# Patient Record
Sex: FEMALE | Race: WHITE | Employment: FULL TIME | URBAN - METROPOLITAN AREA
[De-identification: names, ages, dates, MRNs, and addresses within clinical notes are randomized per-mention and may not be internally consistent; named-entity substitution may affect disease eponyms.]

---

## 2017-06-26 ENCOUNTER — HOSPITAL ENCOUNTER (OUTPATIENT)
Dept: DIABETES SERVICES | Age: 52
Discharge: HOME | End: 2017-06-26
Attending: INTERNAL MEDICINE
Payer: COMMERCIAL

## 2017-06-26 DIAGNOSIS — E11.9 DIABETES MELLITUS WITHOUT COMPLICATION (HCC): ICD-10-CM

## 2017-06-26 PROCEDURE — G0109 DIAB MANAGE TRN IND/GROUP: HCPCS | Performed by: DIETITIAN, REGISTERED

## 2017-06-26 NOTE — DIABETES MGMT
06/26/17        Thank you for your kind referral. Your patient Darwin Barboza, attended Session #1 at [address] [place] [address] where the following topics were covered today . * Describing diabetes disease process and treatment options  * Incorporating nutrition management into their lifestyle  * Monitoring blood glucose and other parameters and interpreting and using the results       for self management decision making. * Preventing detecting and treating acute complications  * Incorporating physical activity into their lifestyle  * Using medications safely and for the maximum therapeutic effectiveness  * Developing personal strategies to promote health and behavior changes  * Developing personal strategies to address psychosocial issues and concerns    Data from first visit:  Weight: 6/26/2017 267.2 #  HgbA1c: 6/26/2017 6.3 %  Increased risk for diabetes: 5.7-6.4%, Diabetes >6.4%  Glycemic control for adults with diabetes: < 7% Elderly or multiple medical conditions <8%  Random blood glucose: 6/26/2017 2 Hrs Post-Lunch 121 mg/dl  Meter given: One Touch Verio Flex  Goal(s) set : Goal 1: Lower A1c by eating less sugar which will help with weight loss    Your patient will have two (2) additional appointments to complete the ordered education. Their next visit is scheduled for 7-17-17. We look forward to assisting your patient in meeting their self- management goals.  If you have any questions please do not hesitate to call the [facility] at [phone]      Sincerely,  Je Ludwig RD, [address], [address]  Phone: [phone] Fax : [phone]

## 2017-08-03 ENCOUNTER — HOSPITAL ENCOUNTER (OUTPATIENT)
Dept: DIABETES SERVICES | Age: 52
Discharge: HOME | End: 2017-08-03
Attending: INTERNAL MEDICINE
Payer: COMMERCIAL

## 2017-08-03 DIAGNOSIS — E11.9 DIABETES MELLITUS WITHOUT COMPLICATION (HCC): ICD-10-CM

## 2017-08-03 PROCEDURE — G0109 DIAB MANAGE TRN IND/GROUP: HCPCS | Performed by: DIETITIAN, REGISTERED

## 2017-08-04 NOTE — DIABETES MGMT
08/04/17      Thank you for your kind referral. Your patient Bacilio Mariscal attended Session #2 at [facility] where the following topics were covered yesterday:      * Incorporating physical activity into lifestyle  * Incorporating nutrition management into lifestyle  * Preventing, detecting and treating acute complications  * Preventing, detecting and treating chronic complications     Your patient will have one( 1) additional appointment to complete the ordered education. Their next visit is scheduled for 8/21/17. We look forward to assisting your patient in meeting their self- management goals.  If you have any questions, please do not hesitate to call the [address] at [phone]    Sincerely,     Nonah Severance, [address]  [address], [address]  Phone:  [phone]  Fax: [phone]

## 2017-08-21 ENCOUNTER — HOSPITAL ENCOUNTER (OUTPATIENT)
Dept: DIABETES SERVICES | Age: 52
Discharge: HOME | End: 2017-08-21
Payer: COMMERCIAL

## 2017-08-21 DIAGNOSIS — E11.9 DIABETES MELLITUS WITHOUT COMPLICATION (HCC): ICD-10-CM

## 2017-08-21 PROCEDURE — G0109 DIAB MANAGE TRN IND/GROUP: HCPCS | Performed by: DIETITIAN, REGISTERED

## 2017-08-21 NOTE — DIABETES MGMT
08/21/17    Thank you for your kind referral. Your patient Harrison Hoyos, attended Session #3 at [address] [place] [address] where the following topics were covered today . * Developing personal strategies to promote health and behavior change  * Development of a diabetes self management support plan  * Incorporating physical activity  * Preventing, detecting and treating chronic complications  * Incorporating nutritional management into lifestyle      Data from visit:  Weight: 6/26/2017 267.2 #; 8/21/2017 264.4 #  HgbA1c: 6/26/2017 6.3 % , 8/21/2017 6.1 %  Increased risk for diabetes: 5.7-6.4 %, Diabetes: >6.4%  Glycemic control for adults with diabetes: <7% Elderly or multiple medical conditions: <8%    Your patient continued the following goal(s) from their first class: Goal 1: Lower A1c by eating less sugar which will help with weight loss    Your patient chose to continue learning about their diabetes after completion of their education by:   Try new recipes and/or cook at home more often      At this time your patient has completed their scheduled education.  If you have any questions, please do not hesitate to call the [facility] at [phone]    Sincerely,    Efrem Mtz, RD, [address] [facility] [address]  [facility] [place] [address] [place] [address] Road   [facility], [address]

## 2018-01-12 ENCOUNTER — HOSPITAL ENCOUNTER (OUTPATIENT)
Dept: DIABETES SERVICES | Age: 53
Discharge: HOME | End: 2018-01-12
Payer: COMMERCIAL

## 2018-01-12 DIAGNOSIS — E11.9 DIABETES MELLITUS WITHOUT COMPLICATION (HCC): ICD-10-CM

## 2018-01-12 PROCEDURE — G0109 DIAB MANAGE TRN IND/GROUP: HCPCS | Performed by: DIETITIAN, REGISTERED

## 2021-06-29 ENCOUNTER — OFFICE VISIT (OUTPATIENT)
Dept: HEMATOLOGY | Age: 56
End: 2021-06-29
Payer: COMMERCIAL

## 2021-06-29 VITALS
WEIGHT: 275.6 LBS | HEIGHT: 66 IN | OXYGEN SATURATION: 97 % | BODY MASS INDEX: 44.29 KG/M2 | RESPIRATION RATE: 17 BRPM | DIASTOLIC BLOOD PRESSURE: 91 MMHG | HEART RATE: 68 BPM | TEMPERATURE: 96.9 F | SYSTOLIC BLOOD PRESSURE: 147 MMHG

## 2021-06-29 DIAGNOSIS — K76.0 FATTY LIVER: Primary | ICD-10-CM

## 2021-06-29 DIAGNOSIS — R74.8 ELEVATED LIVER ENZYMES: ICD-10-CM

## 2021-06-29 PROBLEM — E11.9 TYPE II DIABETES MELLITUS (HCC): Status: ACTIVE | Noted: 2021-06-29

## 2021-06-29 PROBLEM — E03.9 HYPOTHYROIDISM: Status: ACTIVE | Noted: 2021-06-29

## 2021-06-29 PROBLEM — F32.A DEPRESSION: Status: ACTIVE | Noted: 2021-06-29

## 2021-06-29 PROBLEM — E55.9 HYPOVITAMINOSIS D: Status: ACTIVE | Noted: 2021-06-29

## 2021-06-29 PROCEDURE — 99203 OFFICE O/P NEW LOW 30 MIN: CPT | Performed by: INTERNAL MEDICINE

## 2021-06-29 RX ORDER — BLOOD SUGAR DIAGNOSTIC
STRIP MISCELLANEOUS
COMMUNITY
Start: 2021-04-28

## 2021-06-29 RX ORDER — FUROSEMIDE 20 MG/1
TABLET ORAL AS NEEDED
Status: ON HOLD | COMMUNITY
Start: 2021-04-26 | End: 2021-08-31

## 2021-06-29 RX ORDER — CLOBETASOL PROPIONATE 0.5 MG/G
OINTMENT TOPICAL
COMMUNITY
End: 2022-09-14

## 2021-06-29 RX ORDER — TOPIRAMATE 50 MG/1
50 TABLET, FILM COATED ORAL DAILY
COMMUNITY
End: 2022-05-12

## 2021-06-29 RX ORDER — METFORMIN HYDROCHLORIDE 500 MG/1
500 TABLET ORAL 2 TIMES DAILY WITH MEALS
COMMUNITY

## 2021-06-29 RX ORDER — ESCITALOPRAM OXALATE 10 MG/1
10 TABLET ORAL DAILY
COMMUNITY
End: 2022-09-14

## 2021-06-29 RX ORDER — LEVOTHYROXINE SODIUM 125 UG/1
125 TABLET ORAL
COMMUNITY

## 2021-06-29 RX ORDER — MELATONIN
2000 DAILY
COMMUNITY

## 2021-06-29 NOTE — PROGRESS NOTES
Identified pt with two pt identifiers(name and ). Reviewed record in preparation for visit and have obtained necessary documentation. Chief Complaint   Patient presents with    New Patient     Establish care      Vitals:    21 1024   BP: (!) 147/91   Pulse: 68   Resp: 17   Temp: 96.9 °F (36.1 °C)   TempSrc: Temporal   SpO2: 97%   Weight: 275 lb 9.6 oz (125 kg)   Height: 5' 6\" (1.676 m)   PainSc:   0 - No pain       Health Maintenance Review: Patient reminded of \"due or due soon\" health maintenance. I have asked the patient to contact his/her primary care provider (PCP) for follow-up on his/her health maintenance. Coordination of Care Questionnaire:  :   1) Have you been to an emergency room, urgent care, or hospitalized since your last visit? If yes, where when, and reason for visit? no       2. Have seen or consulted any other health care provider since your last visit? If yes, where when, and reason for visit? NO      Patient is accompanied by self I have received verbal consent from Bacilio Mariscal to discuss any/all medical information while they are present in the room.

## 2021-06-29 NOTE — PROGRESS NOTES
[address] [facility] [address]      Conor Lucia MD, Debbie Rothman, Aggie Monzon MD, MPH      Jamir Spain, PAMARIAA Foreman, ACNP-BC     April S Sonya, [facility]   [place], FNP-C    José Miguel Rosado, [facility]-BC       [facility] [address]    at [address], 81 [facility], [facility] 22.    [phone]    FAX: [address] [place] [address] [address], [address]    [phone]    FAX: [phone]       Patient Care Team:  Camille Colindres MD as PCP - General (Family Medicine)      Problem List  Date Reviewed: 6/29/2021        Codes Class Noted    Elevated liver enzymes ICD-10-CM: R74.8  ICD-9-CM: 790.5  6/29/2021        Hypothyroidism ICD-10-CM: E03.9  ICD-9-CM: 244.9  6/29/2021        Fatty liver ICD-10-CM: K76.0  ICD-9-CM: 571.8  6/29/2021        Type II diabetes mellitus ([facility] ICD-10-CM: E11.9  ICD-9-CM: 250.00  6/29/2021        Hypovitaminosis D ICD-10-CM: E55.9  ICD-9-CM: 268.9  6/29/2021        Depression ICD-10-CM: F32.9  ICD-9-CM: 177  6/29/2021              The clinicians listed above have asked me to see Shima Rosario in consultation regarding elevated liver enzymes and its management. All medical records sent by the referring physicians were reviewed     The patient is a 60 y.o.  female who was found to have elevated liver transaminases in the 1990s. Serologic evaluation for markers of chronic liver disease has either not been performed or the results are not available. Imaging of the liver was not performed. An assessment of liver fibrosis with biopsy, Fibroscan or elastography has not been performed. The patient does not have any symptoms which could be attributed to the liver disorder.     The patient is not experiencing the following symptoms which are commonly seen in this liver disorder:   fatigue,   pain in the right side over the liver,     The patient completes all daily activities without any functional limitations. ASSESSMENT AND PLAN:  Elevated liver enzymes  Persistent elevation in liver transaminases of unclear etiology at this time. ALP is normal.  Liver function is normal.  The platelet count is normal.      Serologic testing for causes of chronic liver disease was ordered. All was negative     The most likely causes for the liver chemistry abnormalities were discussed with the patient and include fatty liver disease,     The need to perform an assessment of liver fibrosis was discussed with the patient. The Fibroscan can assess liver fibrosis and determine if a patient has advanced fibrosis or cirrhosis without the need for liver biopsy. This will be performed at the next office visit. If the Fibroscan suggests advanced fibrosis then a liver biopsy should be considered. The Fibroscan can be repeated annually or as often as clinically indicated to assess for fibrosis progression and/or regression. Have performed laboratory testing to monitor liver function and degree of liver injury. This included BMP, hepatic panel, CBC with platelet count, INR. Will perform imaging of the liver with ultrasound. Elevation in Ferritin  There is an elevation in ferritin with Normal iron saturation. It is unlikely that the patient has hemochromatosis or is a carrier for an HFE gene. Suspect the elevation in ferritin reflects hepatic inflammation from NAFLD    Screening for Hepatocellular Carcinoma  HCC screening is not necessary if the patient has no evidence of cirrhosis.     Treatment of other medical problems in patients with chronic liver disease  There are no contraindications for the patient to take most medications that are necessary for treatment of other medical issues. Counseling for alcohol in patients with chronic liver disease  The patient does not consume any significant amount of alcohol. Vaccinations   Vaccination for viral hepatitis A and B is recommended since the patient has no serologic evidence of previous exposure or vaccination with immunity. Routine vaccinations against other bacterial and viral agents can be performed as indicated. Annual flu vaccination should be administered if indicated. ALLERGIES  No Known Allergies    MEDICATIONS  Current Outpatient Medications   Medication Sig    metFORMIN (GLUCOPHAGE) 500 mg tablet Take 500 mg by mouth two (2) times daily (with meals). Take 2 tabs twice daily with meals    levothyroxine (SYNTHROID) 125 mcg tablet Take 125 mcg by mouth Daily (before breakfast).  furosemide (LASIX) 20 mg tablet as needed.  escitalopram oxalate (LEXAPRO) 10 mg tablet Take 10 mg by mouth daily.  topiramate (TOPAMAX) 50 mg tablet Take 50 mg by mouth daily.  cholecalciferol (Vitamin D3) (1000 Units /25 mcg) tablet Take 2,000 Units by mouth daily.  clobetasoL (TEMOVATE) 0.05 % ointment clobetasol 0.05 % topical ointment   apply nightly x 2 weeks then 2-3 nights per week as needed    OneTouch Verio test strips strip      No current facility-administered medications for this visit. SYSTEM REVIEW NOT RELATED TO LIVER DISEASE OR REVIEWED ABOVE:  Constitution systems: Negative for fever, chills, weight gain, weight loss. Eyes: Negative for visual changes. ENT: Negative for sore throat, painful swallowing. Respiratory: Negative for cough, hemoptysis, SOB. Cardiology: Negative for chest pain, palpitations. GI:  Negative for constipation or diarrhea. : Negative for urinary frequency, dysuria, hematuria, nocturia. Skin: Negative for rash. Hematology: Negative for easy bruising, blood clots. Musculo-skelatal: Negative for back pain, muscle pain, weakness.   Neurologic: Negative for headaches, dizziness, vertigo, memory problems not related to HE. Psychology: Negative for anxiety, depression. FAMILY HISTORY:  The father  of heart disease. The mother Has/had the following chronic disease(s): dementia. There is no family history of liver disease. SOCIAL HISTORY:  The patient is . The patient has 2 stepchildren,   The patient has never used tobacco products. The patient has never consumed significant amounts of alcohol. The patient currently works full time as . PHYSICAL EXAMINATION:  Visit Vitals  BP (!) 147/91 (BP 1 Location: Right upper arm, BP Patient Position: Sitting, BP Cuff Size: Large adult)   Pulse 68   Temp 96.9 °F (36.1 °C) (Temporal)   Resp 17   Ht 5' 6\" (1.676 m)   Wt 275 lb 9.6 oz (125 kg)   SpO2 97%   BMI 44.48 kg/m²     General: No acute distress. Eyes: Sclera anicteric. ENT: No oral lesions. Thyroid normal.  Nodes: No adenopathy. Skin: No spider angiomata. No jaundice. No palmar erythema. Respiratory: Lungs clear to auscultation. Cardiovascular: Regular heart rate. No murmurs. No JVD. Abdomen: Soft non-tender. Liver size normal to percussion/palpation. Spleen not palpable. No obvious ascites. Extremities: No edema. No muscle wasting. No gross arthritic changes. Neurologic: Alert and oriented. Cranial nerves grossly intact. No asterixis.     LABORATORY STUDIES:  Liver [place] of [address] Units 2021   WBC 3.4 - 10.8 x10E3/uL 6.6   ANC 1.4 - 7.0 x10E3/uL 3.4   HGB 11.1 - 15.9 g/dL 14.4    - 450 x10E3/uL 269   INR 0.9 - 1.2 1.0   AST 0 - 40 IU/L 57 (H)   ALT 0 - 32 IU/L 87 (H)   Alk Phos 48 - 121 IU/L 72   Bili, Total 0.0 - 1.2 mg/dL 0.2   Bili, Direct 0.00 - 0.40 mg/dL 0.09   Albumin 3.8 - 4.9 g/dL 4.2   BUN 6 - 24 mg/dL 15   Creat 0.57 - 1.00 mg/dL 0.89   Na 134 - 144 mmol/L 139   K 3.5 - 5.2 mmol/L 4.7   Cl 96 - 106 mmol/L 105   CO2 20 - 29 mmol/L 21   Glucose 65 - 99 mg/dL 105 (H)     SEROLOGIES:  Serologies Latest Ref Rng & Units 6/29/2021   Hep A Ab, Total Negative Negative   Hep B Surface Ag Negative Negative   Hep B Core Ab, Total Negative Negative   Hep B Surface AB QL  Non Reactive   Hep C Ab 0.0 - 0.9 s/co ratio <0.1   Ferritin 15 - 150 ng/mL 200 (H)   Iron % Saturation 15 - 55 % 17   LEONARDO, IFA  Negative   ASMCA 0 - 19 Units 6   Ceruloplasmin 19.0 - 39.0 mg/dL 23.6   Alpha-1 antitrypsin level 101 - 187 mg/dL 123     LIVER HISTOLOGY:  Not available or performed    ENDOSCOPIC PROCEDURES:  Not available or performed    RADIOLOGY:  7/2021. Ultrasound of liver. Echogenic consistent with chronic liver or fatty liver disease. No liver mass lesions. No dilated bile ducts. No ascites. OTHER TESTING:  Not available or performed    FOLLOW-UP:  All of the issues listed above in the Assessment and Plan were discussed with the patient. All questions were answered. The patient expressed a clear understanding of the above. [facility] [place] [address] in 4 weeks for Fibroscan to review all data and determine the treatment plan.       Basilio Quintero MD  [address] [facility]  [address], [address] [place] [address], [address]  12 Critical access hospital

## 2021-06-29 NOTE — Clinical Note
7/9/2021    Patient: Diaz Nielsen   YOB: 1965   Date of Visit: 6/29/2021     Joelle Duvall MD  [address]  [address] [place] [address]  Via Fax: [phone]    Dear Joelle Duvall MD,      [facility]. My notes for this consultation are attached. If you have questions, please do not hesitate to call me. I look forward to following your patient along with you.       Sincerely,    Shaylee Christie MD

## 2021-06-30 LAB
A1AT SERPL-MCNC: 123 MG/DL (ref 101–187)
ACTIN IGG SERPL-ACNC: 6 UNITS (ref 0–19)
ALBUMIN SERPL-MCNC: 4.2 G/DL (ref 3.8–4.9)
ALP SERPL-CCNC: 72 IU/L (ref 48–121)
ALT SERPL-CCNC: 87 IU/L (ref 0–32)
ANA TITR SER IF: NEGATIVE {TITER}
AST SERPL-CCNC: 57 IU/L (ref 0–40)
BASOPHILS # BLD AUTO: 0.1 X10E3/UL (ref 0–0.2)
BASOPHILS NFR BLD AUTO: 1 %
BILIRUB DIRECT SERPL-MCNC: 0.09 MG/DL (ref 0–0.4)
BILIRUB SERPL-MCNC: 0.2 MG/DL (ref 0–1.2)
BUN SERPL-MCNC: 15 MG/DL (ref 6–24)
BUN/CREAT SERPL: 17 (ref 9–23)
CALCIUM SERPL-MCNC: 9.9 MG/DL (ref 8.7–10.2)
CERULOPLASMIN SERPL-MCNC: 23.6 MG/DL (ref 19–39)
CHLORIDE SERPL-SCNC: 105 MMOL/L (ref 96–106)
CO2 SERPL-SCNC: 21 MMOL/L (ref 20–29)
CREAT SERPL-MCNC: 0.89 MG/DL (ref 0.57–1)
EOSINOPHIL # BLD AUTO: 0.5 X10E3/UL (ref 0–0.4)
EOSINOPHIL NFR BLD AUTO: 8 %
ERYTHROCYTE [DISTWIDTH] IN BLOOD BY AUTOMATED COUNT: 12.7 % (ref 11.7–15.4)
FERRITIN SERPL-MCNC: 200 NG/ML (ref 15–150)
GLUCOSE SERPL-MCNC: 105 MG/DL (ref 65–99)
HAV AB SER QL IA: NEGATIVE
HBV CORE AB SERPL QL IA: NEGATIVE
HBV SURFACE AB SER QL: NON REACTIVE
HBV SURFACE AG SERPL QL IA: NEGATIVE
HCT VFR BLD AUTO: 44.2 % (ref 34–46.6)
HCV AB S/CO SERPL IA: <0.1 S/CO RATIO (ref 0–0.9)
HCV AB SERPL QL IA: NORMAL
HGB BLD-MCNC: 14.4 G/DL (ref 11.1–15.9)
IMM GRANULOCYTES # BLD AUTO: 0 X10E3/UL (ref 0–0.1)
IMM GRANULOCYTES NFR BLD AUTO: 1 %
INR PPP: 1 (ref 0.9–1.2)
IRON SATN MFR SERPL: 17 % (ref 15–55)
IRON SERPL-MCNC: 54 UG/DL (ref 27–159)
LYMPHOCYTES # BLD AUTO: 2 X10E3/UL (ref 0.7–3.1)
LYMPHOCYTES NFR BLD AUTO: 31 %
MCH RBC QN AUTO: 28.7 PG (ref 26.6–33)
MCHC RBC AUTO-ENTMCNC: 32.6 G/DL (ref 31.5–35.7)
MCV RBC AUTO: 88 FL (ref 79–97)
MONOCYTES # BLD AUTO: 0.6 X10E3/UL (ref 0.1–0.9)
MONOCYTES NFR BLD AUTO: 9 %
NEUTROPHILS # BLD AUTO: 3.4 X10E3/UL (ref 1.4–7)
NEUTROPHILS NFR BLD AUTO: 50 %
PLATELET # BLD AUTO: 269 X10E3/UL (ref 150–450)
POTASSIUM SERPL-SCNC: 4.7 MMOL/L (ref 3.5–5.2)
PROT SERPL-MCNC: 7.5 G/DL (ref 6–8.5)
PROTHROMBIN TIME: 10.8 SEC (ref 9.1–12)
RBC # BLD AUTO: 5.02 X10E6/UL (ref 3.77–5.28)
SODIUM SERPL-SCNC: 139 MMOL/L (ref 134–144)
TIBC SERPL-MCNC: 314 UG/DL (ref 250–450)
UIBC SERPL-MCNC: 260 UG/DL (ref 131–425)
WBC # BLD AUTO: 6.6 X10E3/UL (ref 3.4–10.8)

## 2021-07-01 ENCOUNTER — HOSPITAL ENCOUNTER (OUTPATIENT)
Dept: ULTRASOUND IMAGING | Age: 56
Discharge: HOME | End: 2021-07-01
Attending: INTERNAL MEDICINE
Payer: COMMERCIAL

## 2021-07-01 DIAGNOSIS — R74.8 ELEVATED LIVER ENZYMES: ICD-10-CM

## 2021-07-01 PROCEDURE — 76705 ECHO EXAM OF ABDOMEN: CPT

## 2021-07-09 ENCOUNTER — TELEPHONE (OUTPATIENT)
Dept: HEMATOLOGY | Age: 56
End: 2021-07-09

## 2021-08-17 ENCOUNTER — OFFICE VISIT (OUTPATIENT)
Dept: HEMATOLOGY | Age: 56
End: 2021-08-17
Payer: COMMERCIAL

## 2021-08-17 VITALS
HEART RATE: 71 BPM | DIASTOLIC BLOOD PRESSURE: 82 MMHG | OXYGEN SATURATION: 98 % | RESPIRATION RATE: 21 BRPM | BODY MASS INDEX: 43.33 KG/M2 | WEIGHT: 269.6 LBS | HEIGHT: 66 IN | SYSTOLIC BLOOD PRESSURE: 144 MMHG | TEMPERATURE: 97.2 F

## 2021-08-17 DIAGNOSIS — K76.0 FATTY LIVER: Primary | ICD-10-CM

## 2021-08-17 PROCEDURE — 99214 OFFICE O/P EST MOD 30 MIN: CPT | Performed by: INTERNAL MEDICINE

## 2021-08-17 PROCEDURE — 91200 LIVER ELASTOGRAPHY: CPT | Performed by: INTERNAL MEDICINE

## 2021-08-17 NOTE — Clinical Note
9/4/2021    Patient: Francesca Salcedo   YOB: 1965   Date of Visit: 8/17/2021     Ace Ramirez MD  [facility] [address]  [address] [address]  Via Fax: [phone]    Dear Ace Ramirez MD,      Thank you for referring Ms. Leatha Khan to [address] [facility] for evaluation. My notes for this consultation are attached. If you have questions, please do not hesitate to call me. I look forward to following your patient along with you.       Sincerely,    Elinor Villalobos MD

## 2021-08-17 NOTE — PROGRESS NOTES
Susan Watts is a 60 y.o. female    Chief Complaint   Patient presents with    Follow-up     1. Have you been to the ER, urgent care clinic since your last visit? Hospitalized since your last visit? No     2. Have you seen or consulted any other health care providers outside of the [address] [place] [address] since your last visit? Include any pap smears or colon screening.   No      Visit Vitals  BP (!) 144/82   Pulse 71   Temp 97.2 °F (36.2 °C)   Resp 21   Ht 5' 6\" (1.676 m)   Wt 269 lb 9.6 oz (122.3 kg)   SpO2 98%   BMI 43.51 kg/m²

## 2021-08-17 NOTE — PROGRESS NOTES
[address] [facility]      Karin Gunderson MD, Bri Headley, Sigifredo Rea MD, MPH      Edel Tellez, PAMARIAA Forrest, [facility]     Radha CHAVIS Sonya, [facility]   Zane Denis, P-C    Valery Kelley, [facility]       Guerojosé manuel [facility] [address] 136    at [address], [address] [place] [address], [facility].    [phone]    FAX: [address] [place] [address] [address], [address]    [phone]    FAX: [phone]       Patient Care Team:  Gulshan Carrington MD as PCP - General (Family Medicine)      Problem List  Date Reviewed: 6/29/2021        Codes Class Noted    Elevated liver enzymes ICD-10-CM: R74.8  ICD-9-CM: 790.5  6/29/2021        Hypothyroidism ICD-10-CM: E03.9  ICD-9-CM: 244.9  6/29/2021        Fatty liver ICD-10-CM: K76.0  ICD-9-CM: 571.8  6/29/2021        Type II diabetes mellitus ([facility] ICD-10-CM: E11.9  ICD-9-CM: 250.00  6/29/2021        Hypovitaminosis D ICD-10-CM: E55.9  ICD-9-CM: 268.9  6/29/2021        Depression ICD-10-CM: F32.9  ICD-9-CM: 647  6/29/2021              Lynnette Solis is being seen at [address] for management of non-alcoholic fatty liver disease (NAFLD). The active problem list, all pertinent past medical history, medications, radiologic findings and laboratory findings related to the liver disorder were reviewed and discussed with the patient. The patient is a 60 y.o.  female who was found to have elevated liver transaminases in the 1990s. Serologic evaluation for markers of chronic liver disease has either not been performed or the results are not available. The most recent imaging of the liver was Ultrasound performed in 7/2021. Results suggest fatty liver disease.       Assessment of liver fibrosis with Fibroscan was performed in the office today. The result was 8.2 kPa which correlates with stage 2 septal fibrosis. The CAP score of 356 suggests hepatic steatosis. The patient does not have any symptoms which could be attributed to the liver disorder. The patient is not experiencing the following symptoms which are commonly seen in this liver disorder:   fatigue,   pain in the right side over the liver,     The patient completes all daily activities without any functional limitations. ASSESSMENT AND PLAN:  NAFLD  Suspect the patient has fatty liver based upon imaging, Fiboscan CAP score, features of metabolic syndrome,   serologic studies that are negative for other causes of chronic liver disease,     A liver biopsy has not been performed. Fibroscan in 8/2021 demonstrated  8.2 kPa and  suggesting fatty liver and stage 2 septal fibrosis    Liver transaminases are elevated. ALP is normal.  Liver function is normal.  The platelet count is   normal.      The need to perform a liver biopsy to help determine the cause and severity of the liver test abnormalities was discussed. The risks of performing the liver biopsy including pain, puncture of the lung, gallbladder, intestine or kidney and bleeding were discussed. The patient has decided to have a liver biopsy. This will be scheduled. Elevation in Ferritin  There is an elevation in ferritin with Normal iron saturation. It is unlikely that the patient has hemochromatosis or is a carrier for an HFE gene. Suspect the elevation in ferritin reflects hepatic inflammation from NAFLD    Screening for Hepatocellular Carcinoma  HCC screening is not necessary if the patient has no evidence of cirrhosis. Treatment of other medical problems in patients with chronic liver disease  There are no contraindications for the patient to take most medications that are necessary for treatment of other medical issues.     Counseling for alcohol in patients with chronic liver disease  The patient does not consume any significant amount of alcohol. Vaccinations   Vaccination for viral hepatitis A and B is recommended since the patient has no serologic evidence of previous exposure or vaccination with immunity. Routine vaccinations against other bacterial and viral agents can be performed as indicated. Annual flu vaccination should be administered if indicated. ALLERGIES  No Known Allergies    MEDICATIONS  Current Outpatient Medications   Medication Sig    metFORMIN (GLUCOPHAGE) 500 mg tablet Take 500 mg by mouth two (2) times daily (with meals). Take 2 tabs twice daily with meals    levothyroxine (SYNTHROID) 125 mcg tablet Take 125 mcg by mouth Daily (before breakfast).  furosemide (LASIX) 20 mg tablet as needed.  escitalopram oxalate (LEXAPRO) 10 mg tablet Take 10 mg by mouth daily.  topiramate (TOPAMAX) 50 mg tablet Take 50 mg by mouth daily.  cholecalciferol (Vitamin D3) (1000 Units /25 mcg) tablet Take 2,000 Units by mouth daily.  clobetasoL (TEMOVATE) 0.05 % ointment clobetasol 0.05 % topical ointment   apply nightly x 2 weeks then 2-3 nights per week as needed    OneTouch Verio test strips strip      No current facility-administered medications for this visit. SYSTEM REVIEW NOT RELATED TO LIVER DISEASE OR REVIEWED ABOVE:  Constitution systems: Negative for fever, chills, weight gain, weight loss. Eyes: Negative for visual changes. ENT: Negative for sore throat, painful swallowing. Respiratory: Negative for cough, hemoptysis, SOB. Cardiology: Negative for chest pain, palpitations. GI:  Negative for constipation or diarrhea. : Negative for urinary frequency, dysuria, hematuria, nocturia. Skin: Negative for rash. Hematology: Negative for easy bruising, blood clots. Musculo-skelatal: Negative for back pain, muscle pain, weakness.   Neurologic: Negative for headaches, dizziness, vertigo, memory problems not related to HE. Psychology: Negative for anxiety, depression. FAMILY HISTORY:  The father  of heart disease. The mother Has/had the following chronic disease(s): dementia. There is no family history of liver disease. SOCIAL HISTORY:  The patient is . The patient has 2 stepchildren,   The patient has never used tobacco products. The patient has never consumed significant amounts of alcohol. The patient currently works full time as . PHYSICAL EXAMINATION:  Visit Vitals  BP (!) 144/82   Pulse 71   Temp 97.2 °F (36.2 °C)   Resp 21   Ht 5' 6\" (1.676 m)   Wt 269 lb 9.6 oz (122.3 kg)   SpO2 98%   BMI 43.51 kg/m²     General: No acute distress. Eyes: Sclera anicteric. ENT: No oral lesions. Thyroid normal.  Nodes: No adenopathy. Skin: No spider angiomata. No jaundice. No palmar erythema. Respiratory: Lungs clear to auscultation. Cardiovascular: Regular heart rate. No murmurs. No JVD. Abdomen: Soft non-tender. Liver size normal to percussion/palpation. Spleen not palpable. No obvious ascites. Extremities: No edema. No muscle wasting. No gross arthritic changes. Neurologic: Alert and oriented. Cranial nerves grossly intact. No asterixis.     LABORATORY STUDIES:  Liver [place] of [address] Units 2021   WBC 3.4 - 10.8 x10E3/uL 6.6   ANC 1.4 - 7.0 x10E3/uL 3.4   HGB 11.1 - 15.9 g/dL 14.4    - 450 x10E3/uL 269   INR 0.9 - 1.2 1.0   AST 0 - 40 IU/L 57 (H)   ALT 0 - 32 IU/L 87 (H)   Alk Phos 48 - 121 IU/L 72   Bili, Total 0.0 - 1.2 mg/dL 0.2   Bili, Direct 0.00 - 0.40 mg/dL 0.09   Albumin 3.8 - 4.9 g/dL 4.2   BUN 6 - 24 mg/dL 15   Creat 0.57 - 1.00 mg/dL 0.89   Na 134 - 144 mmol/L 139   K 3.5 - 5.2 mmol/L 4.7   Cl 96 - 106 mmol/L 105   CO2 20 - 29 mmol/L 21   Glucose 65 - 99 mg/dL 105 (H)     SEROLOGIES:  Serologies Latest Ref Rng & Units 2021   Hep A Ab, Total Negative Negative   Hep B Surface Ag Negative Negative   Hep B Core Ab, Total Negative Negative   Hep B Surface AB QL  Non Reactive   Hep C Ab 0.0 - 0.9 s/co ratio <0.1   Ferritin 15 - 150 ng/mL 200 (H)   Iron % Saturation 15 - 55 % 17   LEONARDO, IFA  Negative   ASMCA 0 - 19 Units 6   Ceruloplasmin 19.0 - 39.0 mg/dL 23.6   Alpha-1 antitrypsin level 101 - 187 mg/dL 123     LIVER HISTOLOGY:  8/2021. [facility]. EkPa was 8.2. IQR/med 21%. . The results suggested a fibrosis level of F2. The CAP score suggests there is hepatic steatosis. ENDOSCOPIC PROCEDURES:  Not available or performed    RADIOLOGY:  7/2021. Ultrasound of liver. Echogenic consistent with chronic liver or fatty liver disease. No liver mass lesions. No dilated bile ducts. No ascites. OTHER TESTING:  Not available or performed    FOLLOW-UP:  All of the issues listed above in the Assessment and Plan were discussed with the patient. All questions were answered. The patient expressed a clear understanding of the above. [address] [facility] 87 in 2 weeks after liver biopsy.       Sarina Hoang MD  [address]  4 [facility], [address] [place] [address] Consuelo Brown, [address]  12 [facility]

## 2021-08-24 ENCOUNTER — TRANSCRIBE ORDER (OUTPATIENT)
Dept: SCHEDULING | Age: 56
End: 2021-08-24

## 2021-08-24 DIAGNOSIS — K76.0 FATTY LIVER: Primary | ICD-10-CM

## 2021-08-27 ENCOUNTER — TRANSCRIBE ORDER (OUTPATIENT)
Dept: REGISTRATION | Age: 56
End: 2021-08-27

## 2021-08-27 ENCOUNTER — HOSPITAL ENCOUNTER (OUTPATIENT)
Dept: PREADMISSION TESTING | Age: 56
Discharge: HOME | End: 2021-08-27
Payer: COMMERCIAL

## 2021-08-27 DIAGNOSIS — Z01.812 PRE-PROCEDURE LAB EXAM: Primary | ICD-10-CM

## 2021-08-27 DIAGNOSIS — Z01.812 PRE-PROCEDURE LAB EXAM: ICD-10-CM

## 2021-08-27 PROCEDURE — U0005 INFEC AGEN DETEC AMPLI PROBE: HCPCS

## 2021-08-29 LAB
SARS-COV-2, XPLCVT: NOT DETECTED
SOURCE, COVRS: NORMAL

## 2021-08-31 ENCOUNTER — HOSPITAL ENCOUNTER (OUTPATIENT)
Age: 56
Setting detail: OUTPATIENT SURGERY
Discharge: HOME | End: 2021-08-31
Attending: INTERNAL MEDICINE | Admitting: INTERNAL MEDICINE
Payer: COMMERCIAL

## 2021-08-31 ENCOUNTER — APPOINTMENT (OUTPATIENT)
Dept: ULTRASOUND IMAGING | Age: 56
End: 2021-08-31
Attending: INTERNAL MEDICINE
Payer: COMMERCIAL

## 2021-08-31 VITALS
BODY MASS INDEX: 43.39 KG/M2 | RESPIRATION RATE: 14 BRPM | TEMPERATURE: 97.3 F | HEIGHT: 66 IN | DIASTOLIC BLOOD PRESSURE: 85 MMHG | HEART RATE: 63 BPM | WEIGHT: 270 LBS | OXYGEN SATURATION: 98 % | SYSTOLIC BLOOD PRESSURE: 162 MMHG

## 2021-08-31 DIAGNOSIS — K76.0 FATTY LIVER: ICD-10-CM

## 2021-08-31 DIAGNOSIS — R74.8 ELEVATED LIVER ENZYMES: ICD-10-CM

## 2021-08-31 PROCEDURE — 77030013826 HC NDL BIOP MAXCOR BARD -B: Performed by: INTERNAL MEDICINE

## 2021-08-31 PROCEDURE — 76040000019: Performed by: INTERNAL MEDICINE

## 2021-08-31 PROCEDURE — 76942 ECHO GUIDE FOR BIOPSY: CPT

## 2021-08-31 PROCEDURE — 88313 SPECIAL STAINS GROUP 2: CPT

## 2021-08-31 PROCEDURE — 76942 ECHO GUIDE FOR BIOPSY: CPT | Performed by: INTERNAL MEDICINE

## 2021-08-31 PROCEDURE — 47000 NEEDLE BIOPSY OF LIVER PERQ: CPT | Performed by: INTERNAL MEDICINE

## 2021-08-31 PROCEDURE — 88307 TISSUE EXAM BY PATHOLOGIST: CPT

## 2021-08-31 PROCEDURE — 77030014115: Performed by: INTERNAL MEDICINE

## 2021-08-31 RX ORDER — SODIUM CHLORIDE 0.9 % (FLUSH) 0.9 %
5-40 SYRINGE (ML) INJECTION EVERY 8 HOURS
Status: DISCONTINUED | OUTPATIENT
Start: 2021-08-31 | End: 2021-08-31 | Stop reason: HOSPADM

## 2021-08-31 RX ORDER — LIDOCAINE HYDROCHLORIDE 10 MG/ML
10 INJECTION INFILTRATION; PERINEURAL ONCE
Status: DISCONTINUED | OUTPATIENT
Start: 2021-08-31 | End: 2021-08-31 | Stop reason: HOSPADM

## 2021-08-31 RX ORDER — SODIUM CHLORIDE 0.9 % (FLUSH) 0.9 %
5-40 SYRINGE (ML) INJECTION AS NEEDED
Status: DISCONTINUED | OUTPATIENT
Start: 2021-08-31 | End: 2021-08-31 | Stop reason: HOSPADM

## 2021-08-31 RX ORDER — FENTANYL CITRATE 50 UG/ML
25 INJECTION, SOLUTION INTRAMUSCULAR; INTRAVENOUS
Status: DISCONTINUED | OUTPATIENT
Start: 2021-08-31 | End: 2021-08-31 | Stop reason: HOSPADM

## 2021-08-31 RX ORDER — ONDANSETRON 2 MG/ML
4 INJECTION INTRAMUSCULAR; INTRAVENOUS
Status: DISCONTINUED | OUTPATIENT
Start: 2021-08-31 | End: 2021-08-31 | Stop reason: HOSPADM

## 2021-08-31 NOTE — DISCHARGE INSTRUCTIONS
[address] [facility] MD James Amiel Median, MD, MPH      SILVANO Melgar, [facility]-BC     April PALMIRA Hassan, [facility]   Ml [facility]    Genoveva Quinones [facility]       Guero Rosario Will 136    at [address], [address] [place] [address], Sonya  22.    [phone]    FAX: [address] [place] [address], [address]    [phone]    FAX: [phone]         LIVER BIOPSY DISCHARGE INSTRUCTIONS      Sendy Gutierrez  1965  Date: 8/31/2021    DIET:    Irina Pruitt may resume your previous diet. ACTIVITIES:  Rest quietly the rest of today. You should not lift any objects more than 20 pounds for the next 2 days. If you work sitting down without strenuous activity you may return to work tomorrow. If you exert yourself or do heavy lifting at work you should take tomorrow off. Do not drive or operate hazardous machinery for 12 hours after you are discharged from this procedure. SPECIAL INSTRUCTIONS:  Do not use any aspirin or non-steroidal (Motin, Advil, Naproxen, etc) pain medications for the next 2 days. You may use extra-strength Tylenol (acetaminophen) if you experience pain or discomfort later today. Restarting blood thinners: If you were taking blood thinners prior to the procedure you can restart these in 2 days. Call the [facility] office if you experience any of the following:  Persistent or severe abdominal pain. Persistent or severe abdominal distention. Fever and chills   Nausea and vomiting. New or unusual symptoms. Follow-up care: You should have a follow up appointment with Dr. Onelia Che to review the results of the liver biopsy results in 2 weeks.   If you do not have an appointment please call the office at the number listed above to schedule this. Other instructions: If you have any problems or questions call the [address] office at the phone number listed above. DISCHARGE SUMMARY from Nurse: The following personal items collected during your admission are returned to you:   Dental Appliance: Dental Appliances: None  Vision: Visual Aid: None  Hearing Aid:    Jewelry:    Clothing:    Other Valuables:    Valuables sent to safe:            Learning About Coronavirus (COVID-19)  Coronavirus (COVID-19): Overview  What is coronavirus ([id number])? The coronavirus disease (COVID-19) is caused by a virus. It is an illness that was first found in [place], [place], in December 2019. It has since spread worldwide. The virus can cause fever, cough, and trouble breathing. In severe cases, it can cause pneumonia and make it hard to breathe without help. It can cause death. Coronaviruses are a large group of viruses. They cause the common cold. They also cause more serious illnesses like Middle East respiratory syndrome (MERS) and severe acute respiratory syndrome (SARS). COVID-19 is caused by a novel coronavirus. That means it's a new type that has not been seen in people before. This virus spreads person-to-person through droplets from coughing and sneezing. It can also spread when you are close to someone who is infected. And it can spread when you touch something that has the virus on it, such as a doorknob or a tabletop. What can you do to protect yourself from coronavirus (COVID-19)? The best way to protect yourself from getting sick is to:  · Avoid areas where there is an outbreak. · Avoid contact with people who may be infected. · Wash your hands often with soap or alcohol-based hand sanitizers. · Avoid crowds and try to stay at least 6 feet away from other people. · Wash your hands often, especially after you cough or sneeze.  Use soap and water, and scrub for at least 20 seconds. If soap and water aren't available, use an alcohol-based hand . · Avoid touching your mouth, nose, and eyes. What can you do to avoid spreading the virus to others? To help avoid spreading the virus to others:  · Cover your mouth with a tissue when you cough or sneeze. Then throw the tissue in the trash. · Use a disinfectant to clean things that you touch often. · Stay home if you are sick or have been exposed to the virus. Don't go to school, work, or public areas. And don't use public transportation. · If you are sick:  ? Leave your home only if you need to get medical care. But call the doctor's office first so they know you're coming. And wear a face mask, if you have one.  ? If you have a face mask, wear it whenever you're around other people. It can help stop the spread of the virus when you cough or sneeze. ? Clean and disinfect your home every day. Use household  and disinfectant wipes or sprays. Take special care to clean things that you grab with your hands. These include doorknobs, remote controls, phones, and handles on your refrigerator and microwave. And don't forget countertops, tabletops, bathrooms, and computer keyboards. When to call for help  Call 911 anytime you think you may need emergency care. For example, call if:  · You have severe trouble breathing. (You can't talk at all.)  · You have constant chest pain or pressure. · You are severely dizzy or lightheaded. · You are confused or can't think clearly. · Your face and lips have a blue color. · You pass out (lose consciousness) or are very hard to wake up. Call your doctor now if you develop symptoms such as:  · Shortness of breath. · Fever. · Cough. If you need to get care, call ahead to the doctor's office for instructions before you go. Make sure you wear a face mask, if you have one, to prevent exposing other people to the virus. Where can you get the latest information?   The following health organizations are tracking and studying this virus. Their websites contain the most up-to-date information. Yolande Faiza also learn what to do if you think you may have been exposed to the virus. · U.S. Centers for Disease Control and Prevention (CDC): The CDC provides updated news about the disease and travel advice. The website also tells you how to prevent the spread of infection. www.cdc.gov  · World Health Organization [facility]): WHO offers information about the virus outbreaks. WHO also has travel advice. www.who.int  Current as of: April 1, 2020               Content Version: 12.4  © 6981-7566 Healthwise, Incorporated. Care instructions adapted under license by your healthcare professional. If you have questions about a medical condition or this instruction, always ask your healthcare professional. [address] any warranty or liability for your use of this information.

## 2021-08-31 NOTE — H&P
Deisy Jones MD, Alexis Gong MD, MPH      Jorge Luis Contreras, PA-C Hermine Spatz, [facility]     Radha Hassan, [facility]   Ana Webster REJI-JASON Dominguez, [facility]       [place] [address] 136    at [address], [address] Sonya Thomason  22.    [phone]    FAX: [address] [place] [address], [address]    [phone]    FAX: [phone]         PRE-PROCEDURE NOTE - LIVER BIOPSY    H and P from last office visit reviewed. Allergies reviewed. Out-patient medication list reviewed. Patient Active Problem List   Diagnosis Code    Elevated liver enzymes R74.8    Hypothyroidism E03.9    Fatty liver K76.0    Type II diabetes mellitus ([facility] [address] [facility] E11.9    Hypovitaminosis D E55.9    Depression F32.9       No Known Allergies    No current facility-administered medications on file prior to encounter. Current Outpatient Medications on File Prior to Encounter   Medication Sig Dispense Refill    metFORMIN (GLUCOPHAGE) 500 mg tablet Take 500 mg by mouth two (2) times daily (with meals). Take 2 tabs twice daily with meals      levothyroxine (SYNTHROID) 125 mcg tablet Take 125 mcg by mouth Daily (before breakfast).  escitalopram oxalate (LEXAPRO) 10 mg tablet Take 10 mg by mouth daily.  topiramate (TOPAMAX) 50 mg tablet Take 50 mg by mouth daily.  cholecalciferol (Vitamin D3) (1000 Units /25 mcg) tablet Take 2,000 Units by mouth daily.  clobetasoL (TEMOVATE) 0.05 % ointment clobetasol 0.05 % topical ointment   apply nightly x 2 weeks then 2-3 nights per week as needed      OneTouch Verio test strips strip          For liver biopsy to assess NALFD.       The risks of the procedure were discussed with the patient. This included bleeding, pain, and puncture of other organs. All questions were answered. The patient wishes to proceed with the procedure. The patient was counseled at length about the risks of marline Covid-19 in the lizzy-operative and post-operative states including the recovery window of their procedure. The patient was made aware that marline Covid-19 after a surgical procedure may worsen their prognosis for recovering from the virus and lend to a higher morbidity and or mortality risk. The patient was given the options of postponing their procedure. All of the risks, benefits, and alternatives were discussed. The patient does  wish to proceed with the procedure. PHYSICAL EXAMINATION:  Visit Vitals  BP (!) 120/94   Pulse 64   Temp 97.3 °F (36.3 °C)   Resp 14   Ht 5' 6\" (1.676 m)   Wt 270 lb (122.5 kg)   SpO2 98%   Breastfeeding No   BMI 43.58 kg/m²       General: No acute distress. Eyes: Sclera anicteric. ENT: No oral lesions. Thyroid normal.  Nodes: No adenopathy. Skin: No spider angiomata. No jaundice. No palmar erythema. Respiratory: Lungs clear to auscultation. Cardiovascular: Regular heart rate. No murmurs. No JVD. Abdomen: Soft non-tender, liver size normal to percussion/palpation. Spleen not palpable. No obvious ascites. Extremities: No edema. No muscle wasting. No gross arthritic changes. Neurologic: Alert and oriented. Cranial nerves grossly intact. No asterixis. LABS:  Lab Results   Component Value Date/Time    WBC 6.6 06/29/2021 11:34 AM    HGB 14.4 06/29/2021 11:34 AM    HCT 44.2 06/29/2021 11:34 AM    PLATELET 666 71/80/7524 11:34 AM    MCV 88 06/29/2021 11:34 AM     Lab Results   Component Value Date/Time    INR 1.0 06/29/2021 11:34 AM    Prothrombin time 10.8 06/29/2021 11:34 AM       ASSESSMENT AND PLAN:  Liver biopsy under ultrasound guidance.     Elinor Villalobos MD  [facility] [address] [address] In[address] Preston, [facility].  201 [facility]

## 2021-08-31 NOTE — PROCEDURES
Tete Ramos MD, [address], [address] Gianfranco Mitchell, Jimbo Mondragon MD, MPH      Svetlana Letitia, PAMARIAA Doss, [facility]-BC     April S Sonya, [facility]   Ted Sarmiento REJI-JASON Richards, [facility]       Guero Bah [facility] 136    at [address], [address] Sonya Thomason  22.    [phone]    FAX: [address] [place] [address], [address] [place] [address] - [address]    [phone]    FAX: [phone]         LIVER BIOPSY PROCEDURE NOTE    Sendy Gutierrez  1965    INDICATIONS/PRE-OPERATIVE  DIAGNOSIS:  NAFLD    : Iesha Orr MD    SURGICAL ASSISTANT:  None    PROSTHETIC DEVICES, TISSUE GRAFTS, TRANSPLANTED ORGANS:  Not applicable    SEDATION: 1% Lidocaine injection 10 ml    PROCEDURE:  Informed consent to perform the procedure was obtained from the patient. The patient was positioned on the edge of the stretcher lying flat in the supine position. Ultrasound was utilized to image the liver. The diaphragm and any major mass lesion or vascular structures within the liver were identified. An appropriate site for liver biopsy was identified. The distance from the surface of the skin to the liver capsule was 5 cm. This area was prepped with betadine and draped in sterile fashion. The skin was infiltrated with 1% lidocaine. The deeper subcutanous tissues and liver capsule overlying the biopsy site were then infiltrated with 1% lidocaine until appropriate anesthesia was obtained. A small incision was made in the skin so the biopsy devise could be easily inserted. A total of 2 passes with the 16 gauge Bard biopsy devise was then made into the liver. Core(s) of liver tissue totaling 4 cm in length were obtained and placed into tissue fixative. A band aid was placed over the biopsy site. The patient was then repositioned on the right side and transported to the recovery area on the stretcher for routine monitoring until discharge. The specimen was sent to pathology for processing via the normal transport mechanism. SPECIMEN COLLECTED: Liver    INTERVENTIONS:  None    ESTIMATED BLOOD LOSS: Negligible.      POST-OPERATIVE DIAGNOSIS: Same as Pre-operative Diagnosis      Slime Mendez MD [facility], [facility] 22.  [phone]  [address] [place] [address]

## 2021-09-23 ENCOUNTER — OFFICE VISIT (OUTPATIENT)
Dept: HEMATOLOGY | Age: 56
End: 2021-09-23
Payer: COMMERCIAL

## 2021-09-23 VITALS
RESPIRATION RATE: 18 BRPM | SYSTOLIC BLOOD PRESSURE: 143 MMHG | OXYGEN SATURATION: 96 % | BODY MASS INDEX: 43.04 KG/M2 | DIASTOLIC BLOOD PRESSURE: 78 MMHG | HEIGHT: 66 IN | TEMPERATURE: 97.9 F | WEIGHT: 267.8 LBS | HEART RATE: 71 BPM

## 2021-09-23 DIAGNOSIS — K75.81 NASH (NONALCOHOLIC STEATOHEPATITIS): Primary | ICD-10-CM

## 2021-09-23 PROCEDURE — 99214 OFFICE O/P EST MOD 30 MIN: CPT | Performed by: INTERNAL MEDICINE

## 2021-09-23 NOTE — PROGRESS NOTES
[address] [facility]      Carolina Mcfarland MD, Cira Harada, Evaline Au, MD, MPH      Kellen Lorenzo, SILVANO Schmidt, [facility]     Radha CHAVIS Sonya, [facility]   Jean Henry FNRAFAEL Higginbotham, [facility]       [facility] 136    at [address], [address] [facility], [facility] 22.    [phone]    FAX: [address] [place] [address] Drive, [address], [address]    [phone]    FAX: [phone]       Patient Care Team:  Martha Keller MD as PCP - General (Family Medicine)      Problem List  Date Reviewed: 9/4/2021        Codes Class Noted    Elevated liver enzymes ICD-10-CM: R74.8  ICD-9-CM: 790.5  6/29/2021        Hypothyroidism ICD-10-CM: E03.9  ICD-9-CM: 244.9  6/29/2021        Fatty liver ICD-10-CM: K76.0  ICD-9-CM: 571.8  6/29/2021        Type II diabetes mellitus ([facility] ICD-10-CM: E11.9  ICD-9-CM: 250.00  6/29/2021        Hypovitaminosis D ICD-10-CM: E55.9  ICD-9-CM: 268.9  6/29/2021        Depression ICD-10-CM: F32.9  ICD-9-CM: 435  6/29/2021              Bianca Orlando is being seen at [facility] for management of non-alcoholic steatohepatitis (LAMA). The active problem list, all pertinent past medical history, medications, liver histology, radiologic findings and laboratory findings related to the liver disorder were reviewed and discussed with the patient. The patient is a 60 y.o.  female who was found to have elevated liver transaminases in the 1990s. Serologic evaluation for markers of chronic liver disease was negative. The patient underwent a liver biopsy in 9/2021. The procedure was well tolerated. I have personally reviewed and interpreted the liver biopsy slides.   This demonstrates LAMA with stage 3 fibrosis. .    The patient does not have any symptoms which could be attributed to the liver disorder. The patient is not experiencing the following symptoms which are commonly seen in this liver disorder:   fatigue,   pain in the right side over the liver,     The patient completes all daily activities without any functional limitations. ASSESSMENT AND PLAN:  LAMA  The diagnosis is based upon liver biopsy, imaging, Fiboscan CAP score, features of metabolic syndrome, serologic studies that are negative for other causes of chronic liver disease,     A liver biopsy performed in 9/2021 demonstrates LAMA with moderate steatosis, mild inflammation, severe ballooning and Stage 3 bridging fibrosis. Fibroscan in 8/2021 demonstrated  8.2 kPa and  suggesting fatty liver and stage 2 fibrosis    Liver transaminases are elevated. ALP is normal.  Liver function is normal.  The platelet count is   normal.      If the patient looses 20% of current body weight, which is 52 pounds, down to a weight of of 210 pounds, all steatosis will have resolved. Once all steatosis has resolved all inflammation will resolve. Then all fibrosis will gradually resolve and the liver could eventually be normal.    There is currently no FDA approved medical treatment for fatty liver, NALFD or LAMA. The only medical treatments for LAMA are though clinical trials. The patient has declined to participate in a clinical trial because she does not want to risk getting a placebo. She would like for us to treat her with a GLP-1 for weight loss. I have suggested that this be incorperated into her DM regimen by her PCP or endocrinologist.    The Fibroscan can be repeated annually or as often as clinically indicated to assess for fibrosis progression and/or regression.     Counseling for diet and weight loss in patients with confirmed or suspected NAFLD  The patient was counseled regarding diet and exercise to achieve weight loss. The best diet for patients with fatty liver is one very low in carbohydrates and enriched with protein such as an Britt's program.      The patient was told not to consume any food products and drinks containing fructose as this enhances hepatic fat synthesis. There is no medication or vitamin supplements that we advocate for LAMA. Using glitazones in patients without diabetes mellitus has been shown to reduce fat content in the liver but has no effect on fibrosis and is associated with weight gain. Vitamin E has also been used but the data is not very good and most experts no longer advocate this. Elevation in Ferritin  There is an elevation in ferritin with Normal iron saturation. It is unlikely that the patient has hemochromatosis or is a carrier for an HFE gene. Suspect the elevation in ferritin reflects hepatic inflammation from NAFLD    Screening for Hepatocellular Carcinoma  HCC screening is not necessary if the patient has no evidence of cirrhosis. Treatment of other medical problems in patients with chronic liver disease  There are no contraindications for the patient to take most medications that are necessary for treatment of other medical issues. Counseling for alcohol in patients with chronic liver disease  The patient does not consume any significant amount of alcohol. Vaccinations   Vaccination for viral hepatitis A and B is recommended since the patient has no serologic evidence of previous exposure or vaccination with immunity. Routine vaccinations against other bacterial and viral agents can be performed as indicated. Annual flu vaccination should be administered if indicated. ALLERGIES  No Known Allergies    MEDICATIONS  Current Outpatient Medications   Medication Sig    metFORMIN (GLUCOPHAGE) 500 mg tablet Take 500 mg by mouth two (2) times daily (with meals).  Take 2 tabs twice daily with meals    levothyroxine (SYNTHROID) 125 mcg tablet Take 125 mcg by mouth Daily (before breakfast).  escitalopram oxalate (LEXAPRO) 10 mg tablet Take 10 mg by mouth daily.  topiramate (TOPAMAX) 50 mg tablet Take 50 mg by mouth daily.  cholecalciferol (Vitamin D3) (1000 Units /25 mcg) tablet Take 2,000 Units by mouth daily.  clobetasoL (TEMOVATE) 0.05 % ointment clobetasol 0.05 % topical ointment   apply nightly x 2 weeks then 2-3 nights per week as needed    OneTouch Verio test strips strip      No current facility-administered medications for this visit. SYSTEM REVIEW NOT RELATED TO LIVER DISEASE OR REVIEWED ABOVE:  Constitution systems: Negative for fever, chills, weight gain, weight loss. Eyes: Negative for visual changes. ENT: Negative for sore throat, painful swallowing. Respiratory: Negative for cough, hemoptysis, SOB. Cardiology: Negative for chest pain, palpitations. GI:  Negative for constipation or diarrhea. : Negative for urinary frequency, dysuria, hematuria, nocturia. Skin: Negative for rash. Hematology: Negative for easy bruising, blood clots. Musculo-skelatal: Negative for back pain, muscle pain, weakness. Neurologic: Negative for headaches, dizziness, vertigo, memory problems not related to HE. Psychology: Negative for anxiety, depression. FAMILY HISTORY:  The father  of heart disease. The mother Has/had the following chronic disease(s): dementia. There is no family history of liver disease. SOCIAL HISTORY:  The patient is . The patient has 2 stepchildren,   The patient has never used tobacco products. The patient has never consumed significant amounts of alcohol. The patient currently works full time as . PHYSICAL EXAMINATION:  Visit Vitals  BP (!) 143/78   Pulse 71   Temp 97.9 °F (36.6 °C)   Resp 18   Ht 5' 6\" (1.676 m)   Wt 267 lb 12.8 oz (121.5 kg)   SpO2 96%   BMI 43.22 kg/m²     General: No acute distress. Eyes: Sclera anicteric. ENT: No oral lesions. Thyroid normal.  Nodes: No adenopathy. Skin: No spider angiomata. No jaundice. No palmar erythema. Respiratory: Lungs clear to auscultation. Cardiovascular: Regular heart rate. No murmurs. No JVD. Abdomen: Soft non-tender. Liver size normal to percussion/palpation. Spleen not palpable. No obvious ascites. Extremities: No edema. No muscle wasting. No gross arthritic changes. Neurologic: Alert and oriented. Cranial nerves grossly intact. No asterixis. LABORATORY STUDIES:  [facility] [address] & Units 6/29/2021   WBC 3.4 - 10.8 x10E3/uL 6.6   ANC 1.4 - 7.0 x10E3/uL 3.4   HGB 11.1 - 15.9 g/dL 14.4    - 450 x10E3/uL 269   INR 0.9 - 1.2 1.0   AST 0 - 40 IU/L 57 (H)   ALT 0 - 32 IU/L 87 (H)   Alk Phos 48 - 121 IU/L 72   Bili, Total 0.0 - 1.2 mg/dL 0.2   Bili, Direct 0.00 - 0.40 mg/dL 0.09   Albumin 3.8 - 4.9 g/dL 4.2   BUN 6 - 24 mg/dL 15   Creat 0.57 - 1.00 mg/dL 0.89   Na 134 - 144 mmol/L 139   K 3.5 - 5.2 mmol/L 4.7   Cl 96 - 106 mmol/L 105   CO2 20 - 29 mmol/L 21   Glucose 65 - 99 mg/dL 105 (H)     SEROLOGIES:  Serologies Latest Ref Rng & Units 6/29/2021   Hep A Ab, Total Negative Negative   Hep B Surface Ag Negative Negative   Hep B Core Ab, Total Negative Negative   Hep B Surface AB QL  Non Reactive   Hep C Ab 0.0 - 0.9 s/co ratio <0.1   Ferritin 15 - 150 ng/mL 200 (H)   Iron % Saturation 15 - 55 % 17   LEONARDO, IFA  Negative   ASMCA 0 - 19 Units 6   Ceruloplasmin 19.0 - 39.0 mg/dL 23.6   Alpha-1 antitrypsin level 101 - 187 mg/dL 123     LIVER HISTOLOGY:  8/2021. [facility] at [facility]. EkPa was 8.2. IQR/med 21%. . The results suggested a fibrosis level of F2. The CAP score suggests there is hepatic steatosis. 9/2021. Slides reviewed by MLS. LAMA. 33-50% macrovesicualr and micovesicular steatosis, mild inflammation, severe ballooning, Stage 3 fibrosis. RANJANA (212).     ENDOSCOPIC PROCEDURES:  Not available or performed    RADIOLOGY:  7/2021. Ultrasound of liver. Echogenic consistent with chronic liver or fatty liver disease. No liver mass lesions. No dilated bile ducts. No ascites. OTHER TESTING:  Not available or performed    FOLLOW-UP:  All of the issues listed above in the Assessment and Plan were discussed with the patient. All questions were answered. The patient expressed a clear understanding of the above. [facility] [address] in 4 months for routine monitoring.       Yosvany Greenberg MD  [address]  [address], [address] [place] [address], [address] [place] [address] Street [address]  [address] [place] [address]

## 2021-09-23 NOTE — PROGRESS NOTES
Jose Lee is a 60 y.o. female    Chief Complaint   Patient presents with    Follow-up     LBX f/u      1. Have you been to the ER, urgent care clinic since your last visit? Hospitalized since your last visit? No     2. Have you seen or consulted any other health care providers outside of the [address] [place] [address] since your last visit? Include any pap smears or colon screening.   No     Visit Vitals  BP (!) 143/78   Pulse 71   Temp 97.9 °F (36.6 °C)   Resp 18   Ht 5' 6\" (1.676 m)   Wt 267 lb 12.8 oz (121.5 kg)   SpO2 96%   BMI 43.22 kg/m²

## 2021-09-23 NOTE — Clinical Note
10/17/2021    Patient: Bianca Orlando   YOB: 1965   Date of Visit: 9/23/2021     Smiley Woodall MD  [facility]  [address] [address]  Via Fax: [phone]    Dear Smiley Woodall MD,      Thank you for referring Ms. Imani Alfaro to [address] [facility] for evaluation. My notes for this consultation are attached. If you have questions, please do not hesitate to call me. I look forward to following your patient along with you.       Sincerely,    Carolina Mcfarland MD

## 2022-01-04 ENCOUNTER — VIRTUAL VISIT (OUTPATIENT)
Dept: HEMATOLOGY | Age: 57
End: 2022-01-04
Payer: COMMERCIAL

## 2022-01-04 DIAGNOSIS — K75.81 NASH (NONALCOHOLIC STEATOHEPATITIS): Primary | ICD-10-CM

## 2022-01-04 PROCEDURE — 99214 OFFICE O/P EST MOD 30 MIN: CPT | Performed by: NURSE PRACTITIONER

## 2022-01-04 NOTE — PROGRESS NOTES
[address] [facility], MD, [address], [place], [place]      SILVANO Jacobs, [facility]     Radha CHAVIS Sonya, [facility]   Evelio Curiel REJI-JASON Nichols, [facility]       [facility]    at [address], [address] [address]  22.    [phone]    FAX: [address] [place] [address], [address]    [phone]    FAX: [phone]     Patient Care Team:  Kalee Louie MD as PCP - General (Family Medicine)    Problem List  Date Reviewed: 9/4/2021          Codes Class Noted    Elevated liver enzymes ICD-10-CM: R74.8  ICD-9-CM: 790.5  6/29/2021        Hypothyroidism ICD-10-CM: E03.9  ICD-9-CM: 244.9  6/29/2021        Fatty liver ICD-10-CM: K76.0  ICD-9-CM: 571.8  6/29/2021        Type II diabetes mellitus ([facility] ICD-10-CM: E11.9  ICD-9-CM: 250.00  6/29/2021        Hypovitaminosis D ICD-10-CM: E55.9  ICD-9-CM: 268.9  6/29/2021        Depression ICD-10-CM: F32. A  ICD-9-CM: 263  6/29/2021            VIRTUAL TELEHEALTH VISIT PERFORMED DUE TO COVID-19 EPIDEMIC    CONSENT:  Sendy Gutierrez, who was seen by synchronous, real-time, audio-video technology, and/or her healthcare decision maker, is aware this patient-initiated, TeleHealth encounter on 1/4/2022 is a billable service, with coverage as determined by her insurance carrier. she is aware she may receive a bill and has provided verbal consent to proceed. This patient was evaluated during a Virtual TeleHealth visit. A caregiver was present if appropriate.  Due to this being a TeleHealth encounter performed during the [facility]-12 public health emergency, the physical examination was limited to that listed in the Alexander Kristie is being seen at [facility] for management of non-alcoholic steatohepatitis (LAMA). The active problem list, all pertinent past medical history, medications, liver histology, radiologic findings and laboratory findings related to the liver disorder were reviewed and discussed with the patient. The patient is a 60 y.o.  female who was found to have elevated liver transaminases in the 1990s. The patient does not have any symptoms which could be attributed to the liver disorder. The patient is not experiencing the following symptoms which are commonly seen in this liver disorder: fatigue and pain in the right side over the liver. The patient completes all daily activities without any functional limitations. She is down 17 pounds since September 2021 and has been started on GLP-1.     ASSESSMENT AND PLAN:  LAMA  The diagnosis is based upon liver biopsy, imaging, FibroScan CAP score, features of metabolic syndrome and serologic studies negative for other causes of chronic liver disease. A liver biopsy performed in 9/2021 demonstrates LAMA with moderate steatosis, mild inflammation, severe ballooning and stage 3 bridging fibrosis. FibroScan in 8/2021 demonstrated  8.2 kPa and  suggesting fatty liver and stage 2 fibrosis. AST is normal. ALT is normal. ALP is normal. Liver function is normal. The platelet count is normal.      There is currently no FDA approved medical treatment for fatty liver, NALFD or LAMA. The only medical treatments for LAMA are though clinical trials. The patient has declined to participate in a clinical trial because she does not want to risk getting a placebo. She is currently on a GLP-1 for weight loss. She is down 17 pounds since September 2021. The FibroScan can be repeated annually or as often as clinically indicated to assess for fibrosis progression and/or regression.     Counseling for diet and weight loss in patients with confirmed or suspected NAFLD  The patient was counseled regarding diet and exercise to achieve weight loss. The best diet for patients with fatty liver is one very low in carbohydrates and enriched with protein such as an Britt's program. She is doing well with weight loss and the adjunct use of GLP-1 weight loss medication. We reviewed frequent meals, increased water intake and types of foods to avoid. The patient was told not to consume any food products and drinks containing fructose as this enhances hepatic fat synthesis. There is no medication or vitamin supplements we advocate for LAMA. Using glitazones in patients without diabetes mellitus has been shown to reduce fat content in the liver but has no effect on fibrosis and is associated with weight gain. Vitamin E has also been used but the data is not very good and most experts no longer advocate this. Elevation in ferritin  There is an elevation in ferritin with normal iron saturation. It is unlikely the patient has hemochromatosis or is a carrier for an HFE gene. Suspect the elevation in ferritin reflects hepatic inflammation from LAMA. Screening for hepatocellular carcinoma  HCC screening is recommended in F3 fibrosis. This will be initiated. Treatment of other medical problems in patients with chronic liver disease  There are no contraindications for the patient to take most medications that are necessary for treatment of other medical issues. Counseling for alcohol in patients with chronic liver disease  The patient does not consume any significant amount of alcohol. Vaccinations   Vaccination for viral hepatitis A and B is recommended since the patient has no serologic evidence of previous exposure or vaccination with immunity. Routine vaccinations against other bacterial and viral agents can be performed as indicated. Annual flu vaccination should be administered if indicated.     ALLERGIES  No Known Allergies    MEDICATIONS  Current Outpatient Medications   Medication Sig    metFORMIN (GLUCOPHAGE) 500 mg tablet Take 500 mg by mouth two (2) times daily (with meals). Take 2 tabs twice daily with meals    levothyroxine (SYNTHROID) 125 mcg tablet Take 125 mcg by mouth Daily (before breakfast).  escitalopram oxalate (LEXAPRO) 10 mg tablet Take 10 mg by mouth daily.  topiramate (TOPAMAX) 50 mg tablet Take 50 mg by mouth daily.  cholecalciferol (Vitamin D3) (1000 Units /25 mcg) tablet Take 2,000 Units by mouth daily.  clobetasoL (TEMOVATE) 0.05 % ointment clobetasol 0.05 % topical ointment   apply nightly x 2 weeks then 2-3 nights per week as needed    OneTouch Verio test strips strip      No current facility-administered medications for this visit. ON WEGOVY FOR WEIGHT LOSS. Unable to add without re-ordering medication on VV. FAMILY HISTORY:  The father  of heart disease. The mother has/had the following chronic disease(s): dementia. There is no family history of liver disease. SOCIAL HISTORY:  The patient is . The patient has 2 stepchildren. The patient has never used tobacco products. The patient has never consumed significant amounts of alcohol. The patient currently works full time as a . PHYSICAL EXAMINATION:  There were no vitals taken for this visit. General: No acute distress. Eyes: Sclera anicteric. ENT: No oral lesions. Nodes: No adenopathy. Skin: No spider angiomata. No jaundice. No palmar erythema. Respiratory: No wheezing, respiratory distress, cyanosis. Cardiovascular: No JVD. Abdomen: Appears soft with no obvious ascites. Extremities: No edema. No muscle wasting. No gross arthritic changes. Neurologic: Alert and oriented. Cranial nerves grossly intact. No asterixis.     LABORATORY STUDIES:    From 2021  AST/ALT/ALP/T Bili/ALB: 26/36/66/0.3/4.1  NA/BUN/CREAT: 141/14/0.92    Liver [place] of [address] Units 2021   WBC 3.4 - 10.8 x10E3/uL 6.6   ANC 1.4 - 7.0 x10E3/uL 3.4   HGB 11.1 - 15.9 g/dL 14.4    - 450 x10E3/uL 269   INR 0.9 - 1.2 1.0   AST 0 - 40 IU/L 57 (H)   ALT 0 - 32 IU/L 87 (H)   Alk Phos 48 - 121 IU/L 72   Bili, Total 0.0 - 1.2 mg/dL 0.2   Bili, Direct 0.00 - 0.40 mg/dL 0.09   Albumin 3.8 - 4.9 g/dL 4.2   BUN 6 - 24 mg/dL 15   Creat 0.57 - 1.00 mg/dL 0.89   Na 134 - 144 mmol/L 139   K 3.5 - 5.2 mmol/L 4.7   Cl 96 - 106 mmol/L 105   CO2 20 - 29 mmol/L 21   Glucose 65 - 99 mg/dL 105 (H)     SEROLOGIES:  Serologies Latest Ref Rng & Units 6/29/2021   Hep A Ab, Total Negative Negative   Hep B Surface Ag Negative Negative   Hep B Core Ab, Total Negative Negative   Hep B Surface AB QL  Non Reactive   Hep C Ab 0.0 - 0.9 s/co ratio <0.1   Ferritin 15 - 150 ng/mL 200 (H)   Iron % Saturation 15 - 55 % 17   LEONARDO, IFA  Negative   ASMCA 0 - 19 Units 6   Ceruloplasmin 19.0 - 39.0 mg/dL 23.6   Alpha-1 antitrypsin level 101 - 187 mg/dL 123     LIVER HISTOLOGY:  9/2021. Slides reviewed by MLS. LAMA. 33-50% macrovesicular and microvesicular  steatosis, mild inflammation, severe ballooning, stage 3 fibrosis. RANJANA (212). 8/2021. [facility] performed at [facility]. EkPa was 8.2. IQR/med 21%. . The results suggested a fibrosis level of F2. The CAP score suggests there is hepatic steatosis. ENDOSCOPIC PROCEDURES:  Not available or performed    RADIOLOGY:  7/2021. Ultrasound of liver. Echogenic consistent with chronic liver or fatty liver disease. No liver mass lesions. No dilated bile ducts. No ascites. OTHER TESTING:  Not available or performed    FOLLOW-UP:  All of the issues listed above in the assessment and plan were discussed with the patient. All questions were answered. The patient expressed a clear understanding of the above. [address] [address] in 2 months for assessment of weight loss and repeat labs. No labs today.     Ransom Halsted, ACNP-BC  [address] [facility], [address] [address]  22.  [phone]

## 2022-01-04 NOTE — Clinical Note
NOTIFICATION RETURN TO WORK / SCHOOL    1/4/2022 8:05 AM    Ms. [address] [address]      To Whom It May Concern:    Bradley Fernando is currently under the care of [address] [facility]. She will return to work/school on: ***    If there are questions or concerns please have the patient contact our office. Sincerely,      Gabriela Stuart.  Tania King NP

## 2022-03-18 PROBLEM — F32.A DEPRESSION: Status: ACTIVE | Noted: 2021-06-29

## 2022-03-18 PROBLEM — E03.9 HYPOTHYROIDISM: Status: ACTIVE | Noted: 2021-06-29

## 2022-03-18 PROBLEM — K75.81 NASH (NONALCOHOLIC STEATOHEPATITIS): Status: ACTIVE | Noted: 2022-01-04

## 2022-03-19 PROBLEM — E55.9 HYPOVITAMINOSIS D: Status: ACTIVE | Noted: 2021-06-29

## 2022-03-19 PROBLEM — E11.9 TYPE II DIABETES MELLITUS (HCC): Status: ACTIVE | Noted: 2021-06-29

## 2022-03-19 PROBLEM — R74.8 ELEVATED LIVER ENZYMES: Status: ACTIVE | Noted: 2021-06-29

## 2022-04-27 LAB — HBA1C MFR BLD HPLC: 5.8 %

## 2022-05-12 ENCOUNTER — OFFICE VISIT (OUTPATIENT)
Dept: HEMATOLOGY | Age: 57
End: 2022-05-12
Payer: COMMERCIAL

## 2022-05-12 VITALS
HEIGHT: 66 IN | BODY MASS INDEX: 38.8 KG/M2 | SYSTOLIC BLOOD PRESSURE: 139 MMHG | HEART RATE: 71 BPM | WEIGHT: 241.4 LBS | TEMPERATURE: 97.2 F | DIASTOLIC BLOOD PRESSURE: 92 MMHG | OXYGEN SATURATION: 96 %

## 2022-05-12 DIAGNOSIS — K75.81 NASH (NONALCOHOLIC STEATOHEPATITIS): Primary | ICD-10-CM

## 2022-05-12 PROCEDURE — 99213 OFFICE O/P EST LOW 20 MIN: CPT | Performed by: INTERNAL MEDICINE

## 2022-05-12 RX ORDER — SEMAGLUTIDE 2.4 MG/.75ML
INJECTION, SOLUTION SUBCUTANEOUS
COMMUNITY
Start: 2022-04-26

## 2022-05-12 NOTE — Clinical Note
5/12/2022    Patient: Tiffany Cuello   YOB: 1965   Date of Visit: 5/12/2022     Noel Mcgovern MD  [facility]  [address] [address]  Via Fax: [phone]    Dear Noel Mcgovern MD,      Thank you for referring Ms. Elpidio Puri to [facility] for evaluation. My notes for this consultation are attached. If you have questions, please do not hesitate to call me. I look forward to following your patient along with you.       Sincerely,    Alix Layton MD

## 2022-05-12 NOTE — PROGRESS NOTES
Identified pt with two pt identifiers(name and ). Reviewed record in preparation for visit and have obtained necessary documentation. Chief Complaint   Patient presents with    Other     LAMA f/u with MLS      Vitals:    22 0841   BP: (!) 139/92   Pulse: 71   Temp: 97.2 °F (36.2 °C)   TempSrc: Temporal   SpO2: 96%   Weight: 241 lb 6.4 oz (109.5 kg)   Height: 5' 6\" (1.676 m)   PainSc:   0 - No pain       Health Maintenance Review: Patient reminded of \"due or due soon\" health maintenance. I have asked the patient to contact his/her primary care provider (PCP) for follow-up on his/her health maintenance. Coordination of Care Questionnaire:  :   1) Have you been to an emergency room, urgent care, or hospitalized since your last visit? If yes, where when, and reason for visit? no       2. Have seen or consulted any other health care provider since your last visit? If yes, where when, and reason for visit? NO      Patient is accompanied by self I have received verbal consent from Bacilio Mariscal to discuss any/all medical information while they are present in the room.

## 2022-05-12 NOTE — PROGRESS NOTES
Rodolfo Mckeon [address]      Tariq aTm MD, Stephanie Medina, Tian Montanez MD, MPH      Pro Proctor, PA-JASON No, [facility]-BC     Radha Hassan, [facility]   Radha Cordero, P-C    Maldonado Singh, [facility]       Guero Bah [facility] [address]    at [address], [address] [place] [address], [facility] 22.    [phone]    FAX: [address] [place] [address], [address]    [phone]    FAX: [phone]       Patient Care Team:  Percy Faria MD as PCP - General (Family Medicine)      Problem List  Date Reviewed: 9/4/2021          Codes Class Noted    LAMA (nonalcoholic steatohepatitis) ICD-10-CM: K75.81  ICD-9-CM: 571.8  1/4/2022        Elevated liver enzymes ICD-10-CM: R74.8  ICD-9-CM: 790.5  6/29/2021        Hypothyroidism ICD-10-CM: E03.9  ICD-9-CM: 244.9  6/29/2021        Type II diabetes mellitus ([facility] [address] ICD-10-CM: E11.9  ICD-9-CM: 250.00  6/29/2021        Hypovitaminosis D ICD-10-CM: E55.9  ICD-9-CM: 268.9  6/29/2021        Depression ICD-10-CM: F32. A  ICD-9-CM: 575  6/29/2021              Lucas Shone is being seen at [address] for management of non-alcoholic steatohepatitis (LAMA). The active problem list, all pertinent past medical history, medications, liver histology, radiologic findings and laboratory findings related to the liver disorder were reviewed and discussed with the patient. The patient is a 60 y.o.  female who was found to have elevated liver transaminases in the 1990s. Serologic evaluation for markers of chronic liver disease was negative. The patient underwent a liver biopsy in 9/2021. The procedure was well tolerated. I have personally reviewed and interpreted the liver biopsy slides.   This demonstrates LAMA with stage 3 fibrosis. Dante Neighbours She was started on semiglutide and has lost 24 pounds. With this the liver enzymes have down to normal.    The patient does not have any symptoms which could be attributed to the liver disorder. The patient is not experiencing the following symptoms which are commonly seen in this liver disorder:   fatigue,   pain in the right side over the liver,     The patient completes all daily activities without any functional limitations. ASSESSMENT AND PLAN:  LAMA  The diagnosis is based upon liver biopsy, imaging, Fiboscan CAP score, features of metabolic syndrome, serologic studies that are negative for other causes of chronic liver disease,     A liver biopsy performed in 9/2021 demonstrates LAMA with moderate steatosis, mild inflammation, severe ballooning and Stage 3 bridging fibrosis. Fibroscan in 8/2021 demonstrated  8.2 kPa and  suggesting fatty liver and stage 2 fibrosis    She has lost 24 pounds. Liver transaminases are now normal.  ALP is normal.  Liver function is normal.  The platelet count is normal.      I have encouraged the patient to remain on semiglutide and her current diet. The next goal is to loose another 24 pounds down to 220 pounds. The patient has declined to participate in a clinical trial because she does not want to risk getting a placebo. The Fibroscan can be repeated annually or as often as clinically indicated to assess for fibrosis progression and/or regression. Counseling for diet and weight loss in patients with confirmed or suspected NAFLD  The patient was counseled regarding diet and exercise to achieve weight loss. The best diet for patients with fatty liver is one very low in carbohydrates and enriched with protein such as an Britt's program.      The patient was told not to consume any food products and drinks containing fructose as this enhances hepatic fat synthesis.     There is no medication or vitamin supplements that we advocate for LAMA. Using glitazones in patients without diabetes mellitus has been shown to reduce fat content in the liver but has no effect on fibrosis and is associated with weight gain. Vitamin E has also been used but the data is not very good and most experts no longer advocate this. Elevation in Ferritin  There is an elevation in ferritin with Normal iron saturation. It is unlikely that the patient has hemochromatosis or is a carrier for an HFE gene. Suspect the elevation in ferritin reflects hepatic inflammation from NAFLD    Screening for Hepatocellular Carcinoma  HCC screening is not necessary if the patient has no evidence of cirrhosis. Treatment of other medical problems in patients with chronic liver disease  There are no contraindications for the patient to take most medications that are necessary for treatment of other medical issues. Counseling for alcohol in patients with chronic liver disease  The patient does not consume any significant amount of alcohol. Vaccinations   Vaccination for viral hepatitis A and B is recommended since the patient has no serologic evidence of previous exposure or vaccination with immunity. Routine vaccinations against other bacterial and viral agents can be performed as indicated. Annual flu vaccination should be administered if indicated. ALLERGIES  No Known Allergies    MEDICATIONS  Current Outpatient Medications   Medication Sig    Wegovy 2.4 mg/0.75 mL pnij     metFORMIN (GLUCOPHAGE) 500 mg tablet Take 500 mg by mouth two (2) times daily (with meals). Take 2 tabs twice daily with meals    levothyroxine (SYNTHROID) 125 mcg tablet Take 125 mcg by mouth Daily (before breakfast).  escitalopram oxalate (LEXAPRO) 10 mg tablet Take 10 mg by mouth daily.  cholecalciferol (Vitamin D3) (1000 Units /25 mcg) tablet Take 2,000 Units by mouth daily.     clobetasoL (TEMOVATE) 0.05 % ointment clobetasol 0.05 % topical ointment   apply nightly x 2 weeks then 2-3 nights per week as needed    OneTouch Verio test strips strip     topiramate (TOPAMAX) 50 mg tablet Take 50 mg by mouth daily. (Patient not taking: Reported on 2022)     No current facility-administered medications for this visit. SYSTEM REVIEW NOT RELATED TO LIVER DISEASE OR REVIEWED ABOVE:  Constitution systems: Negative for fever, chills, weight gain, weight loss. Eyes: Negative for visual changes. ENT: Negative for sore throat, painful swallowing. Respiratory: Negative for cough, hemoptysis, SOB. Cardiology: Negative for chest pain, palpitations. GI:  Negative for constipation or diarrhea. : Negative for urinary frequency, dysuria, hematuria, nocturia. Skin: Negative for rash. Hematology: Negative for easy bruising, blood clots. Musculo-skelatal: Negative for back pain, muscle pain, weakness. Neurologic: Negative for headaches, dizziness, vertigo, memory problems not related to HE. Psychology: Negative for anxiety, depression. FAMILY HISTORY:  The father  of heart disease. The mother Has/had the following chronic disease(s): dementia. There is no family history of liver disease. SOCIAL HISTORY:  The patient is . The patient has 2 stepchildren,   The patient has never used tobacco products. The patient has never consumed significant amounts of alcohol. The patient currently works full time as . PHYSICAL EXAMINATION:  Visit Vitals  BP (!) 139/92 (BP 1 Location: Left upper arm, BP Patient Position: Sitting, BP Cuff Size: Adult long)   Pulse 71   Temp 97.2 °F (36.2 °C) (Temporal)   Ht 5' 6\" (1.676 m)   Wt 241 lb 6.4 oz (109.5 kg)   SpO2 96%   BMI 38.96 kg/m²     General: No acute distress. Eyes: Sclera anicteric. ENT: No oral lesions. Thyroid normal.  Nodes: No adenopathy. Skin: No spider angiomata. No jaundice. No palmar erythema.   Respiratory: Lungs clear to auscultation. Cardiovascular: Regular heart rate. No murmurs. No JVD. Abdomen: Soft non-tender. Liver size normal to percussion/palpation. Spleen not palpable. No obvious ascites. Extremities: No edema. No muscle wasting. No gross arthritic changes. Neurologic: Alert and oriented. Cranial nerves grossly intact. No asterixis. LABORATORY STUDIES:  Liver [place] of [address] Units 6/29/2021   WBC 3.4 - 10.8 x10E3/uL 6.6   ANC 1.4 - 7.0 x10E3/uL 3.4   HGB 11.1 - 15.9 g/dL 14.4    - 450 x10E3/uL 269   INR 0.9 - 1.2 1.0   AST 0 - 40 IU/L 57 (H)   ALT 0 - 32 IU/L 87 (H)   Alk Phos 48 - 121 IU/L 72   Bili, Total 0.0 - 1.2 mg/dL 0.2   Bili, Direct 0.00 - 0.40 mg/dL 0.09   Albumin 3.8 - 4.9 g/dL 4.2   BUN 6 - 24 mg/dL 15   Creat 0.57 - 1.00 mg/dL 0.89   Na 134 - 144 mmol/L 139   K 3.5 - 5.2 mmol/L 4.7   Cl 96 - 106 mmol/L 105   CO2 20 - 29 mmol/L 21   Glucose 65 - 99 mg/dL 105 (H)     From 12/2021  AST/ALT/ALP/T Bili/ALB:  26/36/66/0.3/4.1  HBA1C 5.9    From 4/2022  AST/ALT/ALP/T Bili/ALB:  19/24/53/0.2/4.0  HBA1C 5.8    SEROLOGIES:  Serologies Latest Ref Rng & Units 6/29/2021   Hep A Ab, Total Negative Negative   Hep B Surface Ag Negative Negative   Hep B Core Ab, Total Negative Negative   Hep B Surface AB QL  Non Reactive   Hep C Ab 0.0 - 0.9 s/co ratio <0.1   Ferritin 15 - 150 ng/mL 200 (H)   Iron % Saturation 15 - 55 % 17   LEONARDO, IFA  Negative   ASMCA 0 - 19 Units 6   Ceruloplasmin 19.0 - 39.0 mg/dL 23.6   Alpha-1 antitrypsin level 101 - 187 mg/dL 123     LIVER HISTOLOGY:  8/2021. [facility]. EkPa was 8.2. IQR/med 21%. . The results suggested a fibrosis level of F2. The CAP score suggests there is hepatic steatosis. 9/2021. Slides reviewed by MLS. LAMA. 33-50% macrovesicualr and micovesicular steatosis, mild inflammation, severe ballooning, Stage 3 fibrosis. RANJANA (212).     ENDOSCOPIC PROCEDURES:  Not available or performed    RADIOLOGY:  7/2021. Ultrasound of liver. Echogenic consistent with chronic liver or fatty liver disease. No liver mass lesions. No dilated bile ducts. No ascites. OTHER TESTING:  Not available or performed    FOLLOW-UP:  All of the issues listed above in the Assessment and Plan were discussed with the patient. All questions were answered. The patient expressed a clear understanding of the above. [address] [address] in 3-4 months to repeat Fibroscan.         Yuliet Richter MD  [address]  4 [facility], [address] [place] [address] Consuelo Brown, [address]  12 [facility]

## 2022-09-14 ENCOUNTER — OFFICE VISIT (OUTPATIENT)
Dept: HEMATOLOGY | Age: 57
End: 2022-09-14
Payer: COMMERCIAL

## 2022-09-14 VITALS
BODY MASS INDEX: 37.86 KG/M2 | HEIGHT: 66 IN | OXYGEN SATURATION: 98 % | SYSTOLIC BLOOD PRESSURE: 138 MMHG | TEMPERATURE: 98.1 F | WEIGHT: 235.6 LBS | DIASTOLIC BLOOD PRESSURE: 81 MMHG

## 2022-09-14 DIAGNOSIS — K75.81 NASH (NONALCOHOLIC STEATOHEPATITIS): Primary | ICD-10-CM

## 2022-09-14 PROCEDURE — 99214 OFFICE O/P EST MOD 30 MIN: CPT | Performed by: NURSE PRACTITIONER

## 2022-09-14 PROCEDURE — 91200 LIVER ELASTOGRAPHY: CPT | Performed by: NURSE PRACTITIONER

## 2022-09-14 NOTE — PROGRESS NOTES
[address] [facility], Cayetano MARCUS, Benedicto Robert Medrano, Wyshyann      SILVANO Hawley, [facility]-BC     Radha Hassan, [facility]   MICHAEL MathewP-JASON Ibarra, [facility]       Guero Bah Stanislav [address]    at [address], [facility] Sonya Thomason  22.    [phone]    FAX: [address] [place] [address], [address]    [phone]    FAX: [phone]       Patient Care Team:  Igor Mccann MD as PCP - General (Family Medicine)      Problem List  Date Reviewed: 9/4/2021            Codes Class Noted    LAMA (nonalcoholic steatohepatitis) ICD-10-CM: K75.81  ICD-9-CM: 571.8  1/4/2022        Elevated liver enzymes ICD-10-CM: R74.8  ICD-9-CM: 790.5  6/29/2021        Hypothyroidism ICD-10-CM: E03.9  ICD-9-CM: 244.9  6/29/2021        Type II diabetes mellitus ([address] ICD-10-CM: E11.9  ICD-9-CM: 250.00  6/29/2021        Hypovitaminosis D ICD-10-CM: E55.9  ICD-9-CM: 268.9  6/29/2021        Depression ICD-10-CM: F32. A  ICD-9-CM: 094  6/29/2021           Ilana Grissom is being seen at [address] for management of non-alcoholic steatohepatitis (LAMA). The active problem list, all pertinent past medical history, medications, liver histology, radiologic findings and laboratory findings related to the liver disorder were reviewed and discussed with the patient. The patient is a 59 y.o.  female who was found to have elevated liver transaminases in the 1990s. Serologic evaluation for markers of chronic liver disease were negative. Assessment of liver fibrosis was performed with Fibroscan 8/2021. The result was 8.2 kPa which correlates with stage 2 septal fibrosis.  The CAP score of 356 suggests hepatic steatosis. The patient underwent a liver biopsy in 9/2021 by Dr. Dorene Coronado. The results demonstrated LAMA with stage 3 Fibrosis. She returns for Fibroscan today. The patient was started on Wegovy which is a GLP-1 for weight loss in the fall of 2021. Since the last office visit the patient has continued to lose weight and is now down a total of 32 lbs since 9/2021. The patient reports no current symptoms of liver disease. She denies fatigue, pain in the right side over the liver, itching, or swelling of the abdomen. There are no functional limitation in completing activities of daily living. ASSESSMENT AND PLAN:  LAMA  The diagnosis is based upon liver biopsy, imaging, Fiboscan CAP score, features of metabolic syndrome, serologic studies that are negative for other causes of chronic liver disease,     Assessment of liver fibrosis was performed with Fibroscan 8/2021. The result was 8.2 kPa which correlates with stage 2 septal fibrosis. The CAP score of 356 suggests hepatic steatosis. A liver biopsy performed in 9/2021 demonstrates LAMA with moderate steatosis, mild inflammation, severe ballooning and Stage 3 bridging fibrosis. Assessment of liver fibrosis was performed with Fibroscan in the office today. The result was 7.7 kPa which correlates with stage 1 portal fibrosis to stage 2 septal fibrosis. The CAP score of 318 suggests hepatic steatosis. Have performed laboratory testing to monitor liver function and degree of liver injury. This included BMP, hepatic panel, and CBC with platelet count. Laboratory testing performed by the PCP from 8/08/2022 reviewed in detail. The liver transaminases and ALP are normal. The liver function is normal. The platelet count was not completed but normal in the past.     The patient is having successful weight loss on Wegovy. She should continue this regimen until she reaches a close to normal BMI. This is currently 45.      The Fibroscan can be repeated annually or as often as clinically indicated to assess for fibrosis progression and/or regression. Will repeat again in 6 months. Counseling for diet and weight loss in patients with confirmed or suspected NAFLD  The patient was counseled regarding diet and exercise to achieve weight loss. The best diet for patients with fatty liver is one very low in carbohydrates and enriched with protein. The patient was told not to consume any food products and drinks containing fructose as this enhances hepatic fat synthesis. Elevation in Ferritin  There is an elevation in ferritin with Normal iron saturation. It is unlikely that the patient has hemochromatosis or is a carrier for an HFE gene. Suspect the elevation in ferritin reflects hepatic inflammation from NAFLD    Screening for Hepatocellular Carcinoma  HCC screening is not necessary if the patient has no evidence of cirrhosis. Treatment of other medical problems in patients with chronic liver disease  There are no contraindications for the patient to take most medications that are necessary for treatment of other medical issues. Counseling for alcohol in patients with chronic liver disease  The patient does not consume any significant amount of alcohol. Vaccinations   Vaccination for viral hepatitis A and B is recommended since the patient has no serologic evidence of previous exposure or vaccination with immunity. Routine vaccinations against other bacterial and viral agents can be performed as indicated. Annual flu vaccination should be administered if indicated. ALLERGIES  Not on File    MEDICATIONS  Current Outpatient Medications   Medication Sig    Wegovy 2.4 mg/0.75 mL pnij     metFORMIN (GLUCOPHAGE) 500 mg tablet Take 500 mg by mouth two (2) times daily (with meals). Take 2 tabs twice daily with meals    levothyroxine (SYNTHROID) 125 mcg tablet Take 125 mcg by mouth Daily (before breakfast).     cholecalciferol (Vitamin D3) (1000 Units /25 mcg) tablet Take 2,000 Units by mouth daily. OneTouch Verio test strips strip      No current facility-administered medications for this visit. SYSTEM REVIEW NOT RELATED TO LIVER DISEASE OR REVIEWED ABOVE:  Constitution systems: Negative for fever, chills, weight gain, weight loss. Eyes: Negative for visual changes. ENT: Negative for sore throat, painful swallowing. Respiratory: Negative for cough, hemoptysis, SOB. Cardiology: Negative for chest pain, palpitations. GI:  Negative for constipation or diarrhea. : Negative for urinary frequency, dysuria, hematuria, nocturia. Skin: Negative for rash. Hematology: Negative for easy bruising, blood clots. Musculo-skeletal: Negative for back pain, muscle pain, weakness. Neurologic: Negative for headaches, dizziness, vertigo, memory problems not related to HE. Psychology: Negative for anxiety, depression. FAMILY HISTORY:  The father  of heart disease. The mother Has/had the following chronic disease(s): dementia. There is no family history of liver disease. SOCIAL HISTORY:  The patient is . The patient has 2 stepchildren,   The patient has never used tobacco products. The patient has never consumed significant amounts of alcohol. The patient currently works full time as . PHYSICAL EXAMINATION:  Visit Vitals  /81 (BP 1 Location: Left upper arm, BP Patient Position: Sitting, BP Cuff Size: Adult)   Temp 98.1 °F (36.7 °C) (Temporal)   Ht 5' 6\" (1.676 m)   Wt 235 lb 9.6 oz (106.9 kg)   SpO2 98%   BMI 38.03 kg/m²       General: No acute distress. Eyes: Sclera anicteric. Skin: No spider angiomata. No jaundice. No palmar erythema. Abdomen: Soft non-tender, liver size normal to percussion/palpation. Spleen not palpable. No obvious ascites. Extremities: No edema. No muscle wasting. No gross arthritic changes. Neurologic: Alert and oriented. Cranial nerves grossly intact. No asterixis. LABORATORY STUDIES:  From: 8/08/2022 by PCP   AST/ALT/ALP/T Bili/ALB:19/21/59/0.3/4.3  NA/BUN/CREAT: 142/12/0.80    T. , HDL 54,     7/22/2022. HGB A1C 5.7%    From 4/2022  AST/ALT/ALP/T Bili/ALB:  19/24/53/0.2/4.0  HBA1C 5.8    Liver [place] [address] Ref Rng & Units 6/29/2021   WBC 3.4 - 10.8 x10E3/uL 6.6   ANC 1.4 - 7.0 x10E3/uL 3.4   HGB 11.1 - 15.9 g/dL 14.4    - 450 x10E3/uL 269   INR 0.9 - 1.2 1.0   AST 0 - 40 IU/L 57 (H)   ALT 0 - 32 IU/L 87 (H)   Alk Phos 48 - 121 IU/L 72   Bili, Total 0.0 - 1.2 mg/dL 0.2   Bili, Direct 0.00 - 0.40 mg/dL 0.09   Albumin 3.8 - 4.9 g/dL 4.2   BUN 6 - 24 mg/dL 15   Creat 0.57 - 1.00 mg/dL 0.89   Na 134 - 144 mmol/L 139   K 3.5 - 5.2 mmol/L 4.7   Cl 96 - 106 mmol/L 105   CO2 20 - 29 mmol/L 21   Glucose 65 - 99 mg/dL 105 (H)     From 12/2021  AST/ALT/ALP/T Bili/ALB:  26/36/66/0.3/4.1  HBA1C 5.9    SEROLOGIES:  Serologies Latest Ref Rng & Units 6/29/2021   Hep A Ab, Total Negative Negative   Hep B Surface Ag Negative Negative   Hep B Core Ab, Total Negative Negative   Hep B Surface AB QL  Non Reactive   Hep C Ab 0.0 - 0.9 s/co ratio <0.1   Ferritin 15 - 150 ng/mL 200 (H)   Iron % Saturation 15 - 55 % 17   LEONARDO, IFA  Negative   ASMCA 0 - 19 Units 6   Ceruloplasmin 19.0 - 39.0 mg/dL 23.6   Alpha-1 antitrypsin level 101 - 187 mg/dL 123     LIVER HISTOLOGY:  8/2021. FibroScan performed at [facility] of [address] [place]. EkPa was 8.2. IQR/med 21%. . The results suggested a fibrosis level of F2. The CAP score suggests there is hepatic steatosis. 9/2021. Slides reviewed by MLS. LAMA. 33-50% macrovesicualr and micovesicular steatosis, mild inflammation, severe ballooning, Stage 3 fibrosis. RANJANA (212). 9/2022. FibroScan performed at [address] of [address] [place]. EkPa was 7.7. IQR/med 13%. . The results suggested a fibrosis level of F1-2. The CAP score suggests there is hepatic steatosis.       ENDOSCOPIC PROCEDURES:  Not available or performed    RADIOLOGY:  7/2021. Ultrasound of liver. Echogenic consistent with chronic liver or fatty liver disease. No liver mass lesions. No dilated bile ducts. No ascites. OTHER TESTING:  Not available or performed    FOLLOW-UP:  All of the issues listed above in the Assessment and Plan were discussed with the patient. All questions were answered. The patient expressed a clear understanding of the above. [address] [address] in 6 months with a Fibroscan. April S.  Sonya, [facility] of [address] [address] Inocencio Preston, [address] [facility] 22.  201 [facility]

## 2022-09-14 NOTE — PROGRESS NOTES
Identified pt with two pt identifiers(name and ). Reviewed record in preparation for visit and have obtained necessary documentation. Chief Complaint   Patient presents with    Follow-up     FIBROSCAN      Vitals:    22 0828   BP: 138/81   Temp: 98.1 °F (36.7 °C)   TempSrc: Temporal   SpO2: 98%   Weight: 235 lb 9.6 oz (106.9 kg)   Height: 5' 6\" (1.676 m)   PainSc:   0 - No pain       Health Maintenance Review: Patient reminded of \"due or due soon\" health maintenance. I have asked the patient to contact his/her primary care provider (PCP) for follow-up on his/her health maintenance. Coordination of Care Questionnaire:  :   1) Have you been to an emergency room, urgent care, or hospitalized since your last visit? If yes, where when, and reason for visit? no       2. Have seen or consulted any other health care provider since your last visit? If yes, where when, and reason for visit? YES/ ANNUAL VISIT      Patient is accompanied by self I have received verbal consent from Bacilio Mariscal to discuss any/all medical information while they are present in the room.

## 2023-08-07 ENCOUNTER — OFFICE VISIT (OUTPATIENT)
Age: 58
End: 2023-08-07
Payer: COMMERCIAL

## 2023-08-07 VITALS
HEART RATE: 73 BPM | BODY MASS INDEX: 37.45 KG/M2 | HEIGHT: 66 IN | WEIGHT: 233 LBS | OXYGEN SATURATION: 96 % | SYSTOLIC BLOOD PRESSURE: 150 MMHG | DIASTOLIC BLOOD PRESSURE: 84 MMHG | TEMPERATURE: 96.6 F

## 2023-08-07 DIAGNOSIS — K75.81 NASH (NONALCOHOLIC STEATOHEPATITIS): Primary | ICD-10-CM

## 2023-08-07 PROCEDURE — 91200 LIVER ELASTOGRAPHY: CPT | Performed by: NURSE PRACTITIONER

## 2023-08-07 PROCEDURE — 99213 OFFICE O/P EST LOW 20 MIN: CPT | Performed by: NURSE PRACTITIONER

## 2023-08-07 NOTE — PROGRESS NOTES
MD Darius, FACP, [place], [place]      KEMAL Ch, [facility]   Seth Rivera, [facility]-   Tu Jackson, CECI Sinclair, CECI Miller, PCNP-BC   Mayda Pantoja, [facility]      [address]   at [facility]   [address] [facility], [address] [facility], 13[address] [facility]   [phone]   FAX: [phone]  Liver [place] [facility]   at [facility], [address] [facility], [address]   [phone]   FAX: [phone]     Patient Care Team:  Andrew Ly DO as PCP - General (Family Medicine)    Patient Active Problem List   Diagnosis    Depression    RAMSEY (nonalcoholic steatohepatitis)    Hypothyroidism    Type II diabetes mellitus ([facility])    Hypovitaminosis D    Elevated liver enzymes       Mari Zhang is being seen at [address] [place] [address],[address] [facility] for management of non-alcoholic steatohepatitis (RAMSEY). The active problem list, all pertinent past medical history, medications, liver histology, radiologic findings and laboratory findings related to the liver disorder were reviewed and discussed with the patient. The patient is a 59 y.o.  female who was found to have elevated liver transaminases in the 1990s. Serologic evaluation for markers of chronic liver disease were negative. The patient was started on Wegovy which is a GLP-1 for weight loss in the fall of 2021. Since the last office visit the patient had right knee surgery. Weight loss has been maintained and she reports improvement of the HGB A1C to 5.8%. The patient reports no current symptoms of liver disease. She denies fatigue, pain in the right side over the liver, itching, or swelling of the abdomen. There are no functional limitation in completing activities of daily living.      ASSESSMENT AND

## 2024-08-08 ENCOUNTER — OFFICE VISIT (OUTPATIENT)
Age: 59
End: 2024-08-08

## 2024-08-08 VITALS
OXYGEN SATURATION: 99 % | HEART RATE: 76 BPM | DIASTOLIC BLOOD PRESSURE: 81 MMHG | WEIGHT: 246.2 LBS | RESPIRATION RATE: 18 BRPM | TEMPERATURE: 98.2 F | HEIGHT: 66 IN | BODY MASS INDEX: 39.57 KG/M2 | SYSTOLIC BLOOD PRESSURE: 145 MMHG

## 2024-08-08 DIAGNOSIS — K75.81 METABOLIC DYSFUNCTION-ASSOCIATED STEATOHEPATITIS (MASH): Primary | ICD-10-CM

## 2024-08-08 RX ORDER — CLOBETASOL PROPIONATE 0.5 MG/G
CREAM TOPICAL
COMMUNITY

## 2024-08-08 ASSESSMENT — PATIENT HEALTH QUESTIONNAIRE - PHQ9
SUM OF ALL RESPONSES TO PHQ QUESTIONS 1-9: 0
SUM OF ALL RESPONSES TO PHQ9 QUESTIONS 1 & 2: 0
DEPRESSION UNABLE TO ASSESS: FUNCTIONAL CAPACITY MOTIVATION LIMITS ACCURACY
2. FEELING DOWN, DEPRESSED OR HOPELESS: NOT AT ALL
1. LITTLE INTEREST OR PLEASURE IN DOING THINGS: NOT AT ALL
SUM OF ALL RESPONSES TO PHQ QUESTIONS 1-9: 0

## 2024-08-08 NOTE — PROGRESS NOTES
Identified pt with two pt identifiers(name and ). Reviewed record in preparation for visit and have obtained necessary documentation.  Vitals:    24 0753 24 0756   BP: (!) 152/89 (!) 145/81   Site: Left Upper Arm    Position: Sitting    Cuff Size: Medium Adult    Pulse: 76    Resp: 18    Temp: 98.2 °F (36.8 °C)    TempSrc: Temporal    SpO2: 99%    Weight: 111.7 kg (246 lb 3.2 oz)    Height: 1.676 m (5' 6\")         Health Maintenance Review: Patient reminded of \"due or due soon\" health maintenance. I have asked the patient to contact his/her primary care provider (PCP) for follow-up on his/her health maintenance.    Coordination of Care Questionnaire:  :   1) Have you been to an emergency room, urgent care, or hospitalized since your last visit?  If yes, where when, and reason for visit? no       2. Have seen or consulted any other health care provider since your last visit?   If yes, where when, and reason for visit?  No      Patient is accompanied by self I have received verbal consent from Jerrell Warner to discuss any/all medical information while they are present in the room.

## 2024-08-08 NOTE — PROGRESS NOTES
[facility]      Kenneth Strong MD, FACP, FACG, FAASLD      LEE Quevedo-JORGITO Aguilar, [facility]   Marguerite Coffeyashia, [facility]   Margareth Naun, [facility]  Krishna Gutierrez, [facility]   Susi Servin, [facility]   Therese Manuel, [facility]      [facility]   [address], [address]   [place]  [address] [phone]   FAX: [phone]  Critical access hospital   [address], [address]   [place]  [address] [phone]   FAX: [phone]       Patient Care Team:  Sultana Jon DO as PCP - General (Family Medicine)      Patient Active Problem List   Diagnosis    Depression    RAMSEY (nonalcoholic steatohepatitis)    Hypothyroidism    Type II diabetes mellitus (HCC)    Hypovitaminosis D    Elevated liver enzymes       Jerrell Warner is being seen at [facility] for management of Metabolic Associated SteatoHepatitis (MASH) formerly called RAMSEY.    The active problem list, all pertinent past medical history, medications, liver histology, radiologic findings and laboratory findings related to the liver disorder were reviewed and discussed with the patient.      The patient is a 58 y.o.  female who was found to have elevated liver transaminases in the 1990s.      The patient has been taking Wegovy for weight loss since the fall of 2021.  She has lost 30 pounds but weight has been stable in the 250 pound range for several years.    Assessment of liver fibrosis with Fibroscan was performed in the office today.  The result was 4.1 kPa which correlates with no fibrosis.  The CAP score of 279 suggests hepatic steatosis.    In the office today the patient has the following symptoms:  fatigue,     The patient is not experiencing the following symptoms which are commonly seen in this liver disorder:

## 2025-08-05 ENCOUNTER — OFFICE VISIT (OUTPATIENT)
Age: 60
End: 2025-08-05
Payer: COMMERCIAL

## 2025-08-05 VITALS
HEIGHT: 66 IN | BODY MASS INDEX: 37.73 KG/M2 | OXYGEN SATURATION: 97 % | WEIGHT: 234.8 LBS | HEART RATE: 79 BPM | TEMPERATURE: 98.7 F | DIASTOLIC BLOOD PRESSURE: 77 MMHG | SYSTOLIC BLOOD PRESSURE: 148 MMHG

## 2025-08-05 DIAGNOSIS — K75.81 METABOLIC DYSFUNCTION-ASSOCIATED STEATOHEPATITIS (MASH): Primary | ICD-10-CM

## 2025-08-05 PROCEDURE — 99214 OFFICE O/P EST MOD 30 MIN: CPT | Performed by: NURSE PRACTITIONER

## 2025-08-05 PROCEDURE — 91200 LIVER ELASTOGRAPHY: CPT | Performed by: NURSE PRACTITIONER

## 2025-08-05 RX ORDER — TIRZEPATIDE 15 MG/.5ML
INJECTION, SOLUTION SUBCUTANEOUS
COMMUNITY
Start: 2025-07-22

## 2025-08-05 ASSESSMENT — PATIENT HEALTH QUESTIONNAIRE - PHQ9
10. IF YOU CHECKED OFF ANY PROBLEMS, HOW DIFFICULT HAVE THESE PROBLEMS MADE IT FOR YOU TO DO YOUR WORK, TAKE CARE OF THINGS AT HOME, OR GET ALONG WITH OTHER PEOPLE: NOT DIFFICULT AT ALL
SUM OF ALL RESPONSES TO PHQ QUESTIONS 1-9: 0
SUM OF ALL RESPONSES TO PHQ QUESTIONS 1-9: 0
6. FEELING BAD ABOUT YOURSELF - OR THAT YOU ARE A FAILURE OR HAVE LET YOURSELF OR YOUR FAMILY DOWN: NOT AT ALL
1. LITTLE INTEREST OR PLEASURE IN DOING THINGS: NOT AT ALL
2. FEELING DOWN, DEPRESSED OR HOPELESS: NOT AT ALL
SUM OF ALL RESPONSES TO PHQ QUESTIONS 1-9: 0
8. MOVING OR SPEAKING SO SLOWLY THAT OTHER PEOPLE COULD HAVE NOTICED. OR THE OPPOSITE, BEING SO FIGETY OR RESTLESS THAT YOU HAVE BEEN MOVING AROUND A LOT MORE THAN USUAL: NOT AT ALL
7. TROUBLE CONCENTRATING ON THINGS, SUCH AS READING THE NEWSPAPER OR WATCHING TELEVISION: NOT AT ALL
SUM OF ALL RESPONSES TO PHQ QUESTIONS 1-9: 0
5. POOR APPETITE OR OVEREATING: NOT AT ALL
3. TROUBLE FALLING OR STAYING ASLEEP: NOT AT ALL
9. THOUGHTS THAT YOU WOULD BE BETTER OFF DEAD, OR OF HURTING YOURSELF: NOT AT ALL
DEPRESSION UNABLE TO ASSESS: FUNCTIONAL CAPACITY MOTIVATION LIMITS ACCURACY
4. FEELING TIRED OR HAVING LITTLE ENERGY: NOT AT ALL

## (undated) DEVICE — MAX-CORE® DISPOSABLE CORE BIOPSY INSTRUMENT, 16G X 16CM: Brand: MAX-CORE

## (undated) DEVICE — TRAY BX SFT TISS W/ RUL ALC PREP PD FEN DRP TWL LUERLOCK